# Patient Record
Sex: MALE | Race: WHITE | NOT HISPANIC OR LATINO
[De-identification: names, ages, dates, MRNs, and addresses within clinical notes are randomized per-mention and may not be internally consistent; named-entity substitution may affect disease eponyms.]

---

## 2017-03-20 PROBLEM — Z00.00 ENCOUNTER FOR PREVENTIVE HEALTH EXAMINATION: Status: ACTIVE | Noted: 2017-03-20

## 2017-03-27 ENCOUNTER — APPOINTMENT (OUTPATIENT)
Dept: SURGERY | Facility: CLINIC | Age: 57
End: 2017-03-27

## 2019-02-15 ENCOUNTER — EMERGENCY (EMERGENCY)
Facility: HOSPITAL | Age: 59
LOS: 0 days | Discharge: HOME | End: 2019-02-15
Attending: STUDENT IN AN ORGANIZED HEALTH CARE EDUCATION/TRAINING PROGRAM | Admitting: STUDENT IN AN ORGANIZED HEALTH CARE EDUCATION/TRAINING PROGRAM

## 2019-02-15 VITALS
HEART RATE: 89 BPM | OXYGEN SATURATION: 97 % | RESPIRATION RATE: 18 BRPM | SYSTOLIC BLOOD PRESSURE: 129 MMHG | TEMPERATURE: 98 F | DIASTOLIC BLOOD PRESSURE: 99 MMHG

## 2019-02-15 DIAGNOSIS — R10.32 LEFT LOWER QUADRANT PAIN: ICD-10-CM

## 2019-02-15 DIAGNOSIS — Z90.49 ACQUIRED ABSENCE OF OTHER SPECIFIED PARTS OF DIGESTIVE TRACT: ICD-10-CM

## 2019-02-15 LAB
ALBUMIN SERPL ELPH-MCNC: 4.3 G/DL — SIGNIFICANT CHANGE UP (ref 3.5–5.2)
ALP SERPL-CCNC: 111 U/L — SIGNIFICANT CHANGE UP (ref 30–115)
ALT FLD-CCNC: 27 U/L — SIGNIFICANT CHANGE UP (ref 0–41)
ANION GAP SERPL CALC-SCNC: 15 MMOL/L — HIGH (ref 7–14)
APPEARANCE UR: CLEAR — SIGNIFICANT CHANGE UP
AST SERPL-CCNC: 28 U/L — SIGNIFICANT CHANGE UP (ref 0–41)
BASOPHILS # BLD AUTO: 0.06 K/UL — SIGNIFICANT CHANGE UP (ref 0–0.2)
BASOPHILS NFR BLD AUTO: 0.9 % — SIGNIFICANT CHANGE UP (ref 0–1)
BILIRUB SERPL-MCNC: 0.6 MG/DL — SIGNIFICANT CHANGE UP (ref 0.2–1.2)
BILIRUB UR-MCNC: NEGATIVE — SIGNIFICANT CHANGE UP
BUN SERPL-MCNC: 23 MG/DL — HIGH (ref 10–20)
CALCIUM SERPL-MCNC: 9.4 MG/DL — SIGNIFICANT CHANGE UP (ref 8.5–10.1)
CHLORIDE SERPL-SCNC: 102 MMOL/L — SIGNIFICANT CHANGE UP (ref 98–110)
CO2 SERPL-SCNC: 21 MMOL/L — SIGNIFICANT CHANGE UP (ref 17–32)
COLOR SPEC: YELLOW — SIGNIFICANT CHANGE UP
CREAT SERPL-MCNC: 1.1 MG/DL — SIGNIFICANT CHANGE UP (ref 0.7–1.5)
DIFF PNL FLD: NEGATIVE — SIGNIFICANT CHANGE UP
EOSINOPHIL # BLD AUTO: 0.12 K/UL — SIGNIFICANT CHANGE UP (ref 0–0.7)
EOSINOPHIL NFR BLD AUTO: 1.8 % — SIGNIFICANT CHANGE UP (ref 0–8)
GLUCOSE SERPL-MCNC: 120 MG/DL — HIGH (ref 70–99)
GLUCOSE UR QL: NEGATIVE — SIGNIFICANT CHANGE UP
HCT VFR BLD CALC: 46.1 % — SIGNIFICANT CHANGE UP (ref 42–52)
HGB BLD-MCNC: 16 G/DL — SIGNIFICANT CHANGE UP (ref 14–18)
IMM GRANULOCYTES NFR BLD AUTO: 0.3 % — SIGNIFICANT CHANGE UP (ref 0.1–0.3)
KETONES UR-MCNC: NEGATIVE — SIGNIFICANT CHANGE UP
LEUKOCYTE ESTERASE UR-ACNC: NEGATIVE — SIGNIFICANT CHANGE UP
LIDOCAIN IGE QN: 25 U/L — SIGNIFICANT CHANGE UP (ref 7–60)
LYMPHOCYTES # BLD AUTO: 1.43 K/UL — SIGNIFICANT CHANGE UP (ref 1.2–3.4)
LYMPHOCYTES # BLD AUTO: 21.9 % — SIGNIFICANT CHANGE UP (ref 20.5–51.1)
MCHC RBC-ENTMCNC: 30.8 PG — SIGNIFICANT CHANGE UP (ref 27–31)
MCHC RBC-ENTMCNC: 34.7 G/DL — SIGNIFICANT CHANGE UP (ref 32–37)
MCV RBC AUTO: 88.7 FL — SIGNIFICANT CHANGE UP (ref 80–94)
MONOCYTES # BLD AUTO: 0.51 K/UL — SIGNIFICANT CHANGE UP (ref 0.1–0.6)
MONOCYTES NFR BLD AUTO: 7.8 % — SIGNIFICANT CHANGE UP (ref 1.7–9.3)
NEUTROPHILS # BLD AUTO: 4.4 K/UL — SIGNIFICANT CHANGE UP (ref 1.4–6.5)
NEUTROPHILS NFR BLD AUTO: 67.3 % — SIGNIFICANT CHANGE UP (ref 42.2–75.2)
NITRITE UR-MCNC: NEGATIVE — SIGNIFICANT CHANGE UP
NRBC # BLD: 0 /100 WBCS — SIGNIFICANT CHANGE UP (ref 0–0)
PH UR: 6 — SIGNIFICANT CHANGE UP (ref 5–8)
PLATELET # BLD AUTO: 211 K/UL — SIGNIFICANT CHANGE UP (ref 130–400)
POTASSIUM SERPL-MCNC: 4.9 MMOL/L — SIGNIFICANT CHANGE UP (ref 3.5–5)
POTASSIUM SERPL-SCNC: 4.9 MMOL/L — SIGNIFICANT CHANGE UP (ref 3.5–5)
PROT SERPL-MCNC: 7.3 G/DL — SIGNIFICANT CHANGE UP (ref 6–8)
PROT UR-MCNC: NEGATIVE — SIGNIFICANT CHANGE UP
RBC # BLD: 5.2 M/UL — SIGNIFICANT CHANGE UP (ref 4.7–6.1)
RBC # FLD: 12.1 % — SIGNIFICANT CHANGE UP (ref 11.5–14.5)
SODIUM SERPL-SCNC: 138 MMOL/L — SIGNIFICANT CHANGE UP (ref 135–146)
SP GR SPEC: >=1.03 — SIGNIFICANT CHANGE UP (ref 1.01–1.03)
UROBILINOGEN FLD QL: 0.2 — SIGNIFICANT CHANGE UP (ref 0.2–0.2)
WBC # BLD: 6.54 K/UL — SIGNIFICANT CHANGE UP (ref 4.8–10.8)
WBC # FLD AUTO: 6.54 K/UL — SIGNIFICANT CHANGE UP (ref 4.8–10.8)

## 2019-02-15 RX ORDER — FAMOTIDINE 10 MG/ML
20 INJECTION INTRAVENOUS ONCE
Qty: 0 | Refills: 0 | Status: COMPLETED | OUTPATIENT
Start: 2019-02-15 | End: 2019-02-15

## 2019-02-15 RX ORDER — IOHEXOL 300 MG/ML
30 INJECTION, SOLUTION INTRAVENOUS ONCE
Qty: 0 | Refills: 0 | Status: COMPLETED | OUTPATIENT
Start: 2019-02-15 | End: 2019-02-15

## 2019-02-15 RX ORDER — ACETAMINOPHEN 500 MG
650 TABLET ORAL ONCE
Qty: 0 | Refills: 0 | Status: COMPLETED | OUTPATIENT
Start: 2019-02-15 | End: 2019-02-15

## 2019-02-15 RX ADMIN — Medication 650 MILLIGRAM(S): at 08:59

## 2019-02-15 RX ADMIN — FAMOTIDINE 20 MILLIGRAM(S): 10 INJECTION INTRAVENOUS at 09:00

## 2019-02-15 RX ADMIN — IOHEXOL 30 MILLILITER(S): 300 INJECTION, SOLUTION INTRAVENOUS at 08:59

## 2019-02-15 NOTE — ED PROVIDER NOTE - CLINICAL SUMMARY MEDICAL DECISION MAKING FREE TEXT BOX
pt here for 1 month LLQ ap, labs, ctap negative. pt feeling better w/ supportive care. tolerating PO. well appearing. pt has surg/gi to f/u with.

## 2019-02-15 NOTE — ED PROVIDER NOTE - PLAN OF CARE
pt here for LLQ pain x1 month w/o associated sx. will get belly labs, ctap iv and oral con, ua. r/o pancreatitis, r/o abscess/diverticulitis

## 2019-02-15 NOTE — ED PROVIDER NOTE - CARE PLAN
Principal Discharge DX:	Abdominal pain, unspecified abdominal location  Assessment and plan of treatment:	pt here for LLQ pain x1 month w/o associated sx. will get belly labs, ctap iv and oral con, ua. r/o pancreatitis, r/o abscess/diverticulitis

## 2019-02-15 NOTE — ED PROVIDER NOTE - PHYSICAL EXAMINATION
PHYSICAL EXAM:    Constitutional: awake, alert, NAD  Eyes: EOMI, no conj injection  HENT: NC AT  Back: no c/t/l spine ttp  Respiratory: no respiratory distress, breath sounds equal b/l, no wheezing, rhonchi or stridor.   Cardiovascular: RRR nml S1S2  Gastrointestinal: soft, no masses, mild LLQ tenderness on palpation. nondistended. No guarding or rebound. no cvat  Extremities: no peripheral edema  Neurological: AAOx3, CN II-XII grossly intact, no focal numbness or weakness  Skin: no rash  Musculoskeletal: no gross deformity

## 2019-02-15 NOTE — ED PROVIDER NOTE - NSFOLLOWUPINSTRUCTIONS_ED_ALL_ED_FT
Take ibuprofen 400mg every 4 hours as needed for pain. Follow up with your specialists within 1 month. Return for worsening symptoms.

## 2019-02-15 NOTE — ED PROVIDER NOTE - NS ED ROS FT
Constitutional: no fever or rigors  Eyes: no eye redness, acute visual change  ENMT: no ear pain, no throat pain  Card: no chest pain, no palpitations  Pulm: no cough, no shortness of breath  GI: pos abdominal pain, no nausea or vomiting  : no dysuria or hematuria  MSK: no limitation in range of motion, no neck pain  Skin: no rash, no abrasion  Neuro: no numbness, no weakness  Heme/Onc: no easy bruising, no bleeding tendency   Allergic: no hives, no throat swelling

## 2019-02-15 NOTE — ED PROVIDER NOTE - OBJECTIVE STATEMENT
57 yo m hx arthritis, s/p partial colectomy for diverticulitis years ago by [facility] here for LLQ AP. pain for 1 month, gradually worsening. pain not as bad as prev diverticulitis. no dysuria or hematuria. no fever, chills, cp, sob.

## 2019-02-16 LAB
CULTURE RESULTS: SIGNIFICANT CHANGE UP
SPECIMEN SOURCE: SIGNIFICANT CHANGE UP

## 2021-03-31 ENCOUNTER — TRANSCRIPTION ENCOUNTER (OUTPATIENT)
Age: 61
End: 2021-03-31

## 2021-08-16 ENCOUNTER — TRANSCRIPTION ENCOUNTER (OUTPATIENT)
Age: 61
End: 2021-08-16

## 2021-09-06 ENCOUNTER — TRANSCRIPTION ENCOUNTER (OUTPATIENT)
Age: 61
End: 2021-09-06

## 2022-01-26 ENCOUNTER — INPATIENT (INPATIENT)
Facility: HOSPITAL | Age: 62
LOS: 1 days | Discharge: HOME | End: 2022-01-28
Attending: INTERNAL MEDICINE | Admitting: INTERNAL MEDICINE
Payer: COMMERCIAL

## 2022-01-26 VITALS
OXYGEN SATURATION: 97 % | RESPIRATION RATE: 17 BRPM | SYSTOLIC BLOOD PRESSURE: 125 MMHG | HEART RATE: 100 BPM | TEMPERATURE: 98 F | DIASTOLIC BLOOD PRESSURE: 82 MMHG | WEIGHT: 164.91 LBS

## 2022-01-26 DIAGNOSIS — Z90.49 ACQUIRED ABSENCE OF OTHER SPECIFIED PARTS OF DIGESTIVE TRACT: Chronic | ICD-10-CM

## 2022-01-26 PROBLEM — M19.90 UNSPECIFIED OSTEOARTHRITIS, UNSPECIFIED SITE: Chronic | Status: ACTIVE | Noted: 2019-02-15

## 2022-01-26 LAB
A1C WITH ESTIMATED AVERAGE GLUCOSE RESULT: 7.2 % — HIGH (ref 4–5.6)
ALBUMIN SERPL ELPH-MCNC: 4.3 G/DL — SIGNIFICANT CHANGE UP (ref 3.5–5.2)
ALP SERPL-CCNC: 126 U/L — HIGH (ref 30–115)
ALT FLD-CCNC: 19 U/L — SIGNIFICANT CHANGE UP (ref 0–41)
ANION GAP SERPL CALC-SCNC: 10 MMOL/L — SIGNIFICANT CHANGE UP (ref 7–14)
APTT BLD: 35.9 SEC — SIGNIFICANT CHANGE UP (ref 27–39.2)
AST SERPL-CCNC: 25 U/L — SIGNIFICANT CHANGE UP (ref 0–41)
BASOPHILS # BLD AUTO: 0.07 K/UL — SIGNIFICANT CHANGE UP (ref 0–0.2)
BASOPHILS NFR BLD AUTO: 0.9 % — SIGNIFICANT CHANGE UP (ref 0–1)
BILIRUB SERPL-MCNC: 0.5 MG/DL — SIGNIFICANT CHANGE UP (ref 0.2–1.2)
BLD GP AB SCN SERPL QL: SIGNIFICANT CHANGE UP
BUN SERPL-MCNC: 18 MG/DL — SIGNIFICANT CHANGE UP (ref 10–20)
CALCIUM SERPL-MCNC: 9.2 MG/DL — SIGNIFICANT CHANGE UP (ref 8.5–10.1)
CHLORIDE SERPL-SCNC: 102 MMOL/L — SIGNIFICANT CHANGE UP (ref 98–110)
CHOLEST SERPL-MCNC: 194 MG/DL — SIGNIFICANT CHANGE UP
CO2 SERPL-SCNC: 24 MMOL/L — SIGNIFICANT CHANGE UP (ref 17–32)
CREAT SERPL-MCNC: 1 MG/DL — SIGNIFICANT CHANGE UP (ref 0.7–1.5)
EOSINOPHIL # BLD AUTO: 0.09 K/UL — SIGNIFICANT CHANGE UP (ref 0–0.7)
EOSINOPHIL NFR BLD AUTO: 1.1 % — SIGNIFICANT CHANGE UP (ref 0–8)
ESTIMATED AVERAGE GLUCOSE: 160 MG/DL — HIGH (ref 68–114)
GLUCOSE SERPL-MCNC: 226 MG/DL — HIGH (ref 70–99)
HCT VFR BLD CALC: 40.2 % — LOW (ref 42–52)
HCT VFR BLD CALC: 43.7 % — SIGNIFICANT CHANGE UP (ref 42–52)
HDLC SERPL-MCNC: 36 MG/DL — LOW
HGB BLD-MCNC: 14.1 G/DL — SIGNIFICANT CHANGE UP (ref 14–18)
HGB BLD-MCNC: 15.3 G/DL — SIGNIFICANT CHANGE UP (ref 14–18)
IMM GRANULOCYTES NFR BLD AUTO: 0.4 % — HIGH (ref 0.1–0.3)
INR BLD: 0.99 RATIO — SIGNIFICANT CHANGE UP (ref 0.65–1.3)
LIPID PNL WITH DIRECT LDL SERPL: 131 MG/DL — HIGH
LYMPHOCYTES # BLD AUTO: 1.11 K/UL — LOW (ref 1.2–3.4)
LYMPHOCYTES # BLD AUTO: 13.7 % — LOW (ref 20.5–51.1)
MAGNESIUM SERPL-MCNC: 1.8 MG/DL — SIGNIFICANT CHANGE UP (ref 1.8–2.4)
MCHC RBC-ENTMCNC: 30.6 PG — SIGNIFICANT CHANGE UP (ref 27–31)
MCHC RBC-ENTMCNC: 31.4 PG — HIGH (ref 27–31)
MCHC RBC-ENTMCNC: 35 G/DL — SIGNIFICANT CHANGE UP (ref 32–37)
MCHC RBC-ENTMCNC: 35.1 G/DL — SIGNIFICANT CHANGE UP (ref 32–37)
MCV RBC AUTO: 87.2 FL — SIGNIFICANT CHANGE UP (ref 80–94)
MCV RBC AUTO: 89.5 FL — SIGNIFICANT CHANGE UP (ref 80–94)
MONOCYTES # BLD AUTO: 0.59 K/UL — SIGNIFICANT CHANGE UP (ref 0.1–0.6)
MONOCYTES NFR BLD AUTO: 7.3 % — SIGNIFICANT CHANGE UP (ref 1.7–9.3)
NEUTROPHILS # BLD AUTO: 6.2 K/UL — SIGNIFICANT CHANGE UP (ref 1.4–6.5)
NEUTROPHILS NFR BLD AUTO: 76.6 % — HIGH (ref 42.2–75.2)
NON HDL CHOLESTEROL: 158 MG/DL — HIGH
NRBC # BLD: 0 /100 WBCS — SIGNIFICANT CHANGE UP (ref 0–0)
NRBC # BLD: 0 /100 WBCS — SIGNIFICANT CHANGE UP (ref 0–0)
NT-PROBNP SERPL-SCNC: 263 PG/ML — SIGNIFICANT CHANGE UP (ref 0–300)
PLATELET # BLD AUTO: 206 K/UL — SIGNIFICANT CHANGE UP (ref 130–400)
PLATELET # BLD AUTO: 208 K/UL — SIGNIFICANT CHANGE UP (ref 130–400)
POTASSIUM SERPL-MCNC: 4.7 MMOL/L — SIGNIFICANT CHANGE UP (ref 3.5–5)
POTASSIUM SERPL-SCNC: 4.7 MMOL/L — SIGNIFICANT CHANGE UP (ref 3.5–5)
PROT SERPL-MCNC: 7.3 G/DL — SIGNIFICANT CHANGE UP (ref 6–8)
PROTHROM AB SERPL-ACNC: 11.4 SEC — SIGNIFICANT CHANGE UP (ref 9.95–12.87)
RBC # BLD: 4.61 M/UL — LOW (ref 4.7–6.1)
RBC # BLD: 4.88 M/UL — SIGNIFICANT CHANGE UP (ref 4.7–6.1)
RBC # FLD: 12.3 % — SIGNIFICANT CHANGE UP (ref 11.5–14.5)
RBC # FLD: 12.4 % — SIGNIFICANT CHANGE UP (ref 11.5–14.5)
SARS-COV-2 RNA SPEC QL NAA+PROBE: SIGNIFICANT CHANGE UP
SODIUM SERPL-SCNC: 136 MMOL/L — SIGNIFICANT CHANGE UP (ref 135–146)
TRIGL SERPL-MCNC: 216 MG/DL — HIGH
TROPONIN T SERPL-MCNC: 0.08 NG/ML — CRITICAL HIGH
TROPONIN T SERPL-MCNC: 0.14 NG/ML — CRITICAL HIGH
TSH SERPL-MCNC: 1.01 UIU/ML — SIGNIFICANT CHANGE UP (ref 0.27–4.2)
WBC # BLD: 6.86 K/UL — SIGNIFICANT CHANGE UP (ref 4.8–10.8)
WBC # BLD: 8.09 K/UL — SIGNIFICANT CHANGE UP (ref 4.8–10.8)
WBC # FLD AUTO: 6.86 K/UL — SIGNIFICANT CHANGE UP (ref 4.8–10.8)
WBC # FLD AUTO: 8.09 K/UL — SIGNIFICANT CHANGE UP (ref 4.8–10.8)

## 2022-01-26 PROCEDURE — 93458 L HRT ARTERY/VENTRICLE ANGIO: CPT | Mod: 26,XU

## 2022-01-26 PROCEDURE — 71045 X-RAY EXAM CHEST 1 VIEW: CPT | Mod: 26

## 2022-01-26 PROCEDURE — 93010 ELECTROCARDIOGRAM REPORT: CPT | Mod: 76

## 2022-01-26 PROCEDURE — 92978 ENDOLUMINL IVUS OCT C 1ST: CPT | Mod: 26,RC

## 2022-01-26 PROCEDURE — 92928 PRQ TCAT PLMT NTRAC ST 1 LES: CPT | Mod: RC

## 2022-01-26 PROCEDURE — 99285 EMERGENCY DEPT VISIT HI MDM: CPT

## 2022-01-26 RX ORDER — EPTIFIBATIDE 2 MG/ML
2 INJECTION, SOLUTION INTRAVENOUS
Qty: 75 | Refills: 0 | Status: DISCONTINUED | OUTPATIENT
Start: 2022-01-26 | End: 2022-01-26

## 2022-01-26 RX ORDER — SODIUM CHLORIDE 9 MG/ML
1000 INJECTION INTRAMUSCULAR; INTRAVENOUS; SUBCUTANEOUS
Refills: 0 | Status: COMPLETED | OUTPATIENT
Start: 2022-01-26 | End: 2022-01-26

## 2022-01-26 RX ORDER — NITROGLYCERIN 6.5 MG
10 CAPSULE, EXTENDED RELEASE ORAL
Qty: 50 | Refills: 0 | Status: DISCONTINUED | OUTPATIENT
Start: 2022-01-26 | End: 2022-01-27

## 2022-01-26 RX ORDER — ATORVASTATIN CALCIUM 80 MG/1
80 TABLET, FILM COATED ORAL AT BEDTIME
Refills: 0 | Status: DISCONTINUED | OUTPATIENT
Start: 2022-01-26 | End: 2022-01-28

## 2022-01-26 RX ORDER — ASPIRIN/CALCIUM CARB/MAGNESIUM 324 MG
81 TABLET ORAL DAILY
Refills: 0 | Status: DISCONTINUED | OUTPATIENT
Start: 2022-01-27 | End: 2022-01-28

## 2022-01-26 RX ORDER — MAGNESIUM SULFATE 500 MG/ML
2 VIAL (ML) INJECTION ONCE
Refills: 0 | Status: DISCONTINUED | OUTPATIENT
Start: 2022-01-26 | End: 2022-01-26

## 2022-01-26 RX ORDER — TICAGRELOR 90 MG/1
90 TABLET ORAL EVERY 12 HOURS
Refills: 0 | Status: DISCONTINUED | OUTPATIENT
Start: 2022-01-26 | End: 2022-01-28

## 2022-01-26 RX ORDER — METOPROLOL TARTRATE 50 MG
12.5 TABLET ORAL
Refills: 0 | Status: DISCONTINUED | OUTPATIENT
Start: 2022-01-26 | End: 2022-01-27

## 2022-01-26 RX ORDER — EPTIFIBATIDE 2 MG/ML
2 INJECTION, SOLUTION INTRAVENOUS
Qty: 75 | Refills: 0 | Status: DISCONTINUED | OUTPATIENT
Start: 2022-01-26 | End: 2022-01-27

## 2022-01-26 RX ORDER — ASPIRIN/CALCIUM CARB/MAGNESIUM 324 MG
324 TABLET ORAL ONCE
Refills: 0 | Status: COMPLETED | OUTPATIENT
Start: 2022-01-26 | End: 2022-01-26

## 2022-01-26 RX ORDER — CHLORHEXIDINE GLUCONATE 213 G/1000ML
1 SOLUTION TOPICAL
Refills: 0 | Status: DISCONTINUED | OUTPATIENT
Start: 2022-01-26 | End: 2022-01-28

## 2022-01-26 RX ORDER — PANTOPRAZOLE SODIUM 20 MG/1
40 TABLET, DELAYED RELEASE ORAL
Refills: 0 | Status: DISCONTINUED | OUTPATIENT
Start: 2022-01-26 | End: 2022-01-28

## 2022-01-26 RX ORDER — ASPIRIN/CALCIUM CARB/MAGNESIUM 324 MG
162 TABLET ORAL ONCE
Refills: 0 | Status: DISCONTINUED | OUTPATIENT
Start: 2022-01-26 | End: 2022-01-26

## 2022-01-26 RX ADMIN — Medication 3 MICROGRAM(S)/MIN: at 17:47

## 2022-01-26 RX ADMIN — CHLORHEXIDINE GLUCONATE 1 APPLICATION(S): 213 SOLUTION TOPICAL at 22:26

## 2022-01-26 RX ADMIN — Medication 324 MILLIGRAM(S): at 08:29

## 2022-01-26 RX ADMIN — TICAGRELOR 90 MILLIGRAM(S): 90 TABLET ORAL at 23:45

## 2022-01-26 RX ADMIN — PANTOPRAZOLE SODIUM 40 MILLIGRAM(S): 20 TABLET, DELAYED RELEASE ORAL at 22:26

## 2022-01-26 RX ADMIN — EPTIFIBATIDE 12 MICROGRAM(S)/KG/MIN: 2 INJECTION, SOLUTION INTRAVENOUS at 21:45

## 2022-01-26 RX ADMIN — Medication 12.5 MILLIGRAM(S): at 21:47

## 2022-01-26 RX ADMIN — ATORVASTATIN CALCIUM 80 MILLIGRAM(S): 80 TABLET, FILM COATED ORAL at 21:47

## 2022-01-26 RX ADMIN — SODIUM CHLORIDE 100 MILLILITER(S): 9 INJECTION INTRAMUSCULAR; INTRAVENOUS; SUBCUTANEOUS at 17:46

## 2022-01-26 RX ADMIN — EPTIFIBATIDE 12 MICROGRAM(S)/KG/MIN: 2 INJECTION, SOLUTION INTRAVENOUS at 17:47

## 2022-01-26 NOTE — ED PROVIDER NOTE - ATTENDING CONTRIBUTION TO CARE
61-year-old male family history of CAD, ex-smoker, presenting for substernal chest pain x3 days.  Per patient he has been having intermittent episodes of substernal chest pain worse with exertion and improved with rest.  This morning episode did not resolve as quickly which prompted patient to come to ED.  Pain nonradiating, associated shortness of breath and nausea.  Per patient chest pain currently resolved.  No vomiting.  No lower extremity pain or swelling.  Well appearing, NAD, non toxic. NCAT PERRLA EOMI neck supple non tender normal wob cta bl rrr abdomen s nt nd no rebound no guarding WWPx4 neuro non focal

## 2022-01-26 NOTE — CHART NOTE - NSCHARTNOTEFT_GEN_A_CORE
PRE-OP DIAGNOSIS: NSTEMI    PROCEDURE:   [x] Coronary Angiogram     [x] LHC      [x] Intervention (see below)      PHYSICIAN: Dr. Prado    FELLOW: Dr. Jara, Dr. Ortiz    PROCEDURE DESCRIPTION:     Consent:      [X] Patient      Anesthesia:     [X] Sedation     [x] Local     Access & Closure:     [x] 6Fr Radial Artery sheath left in place for hemodynamic BP monitoring    Intervention: PCI ANALIA x 2 Resolute to mRCA and dRCA stenosis    AUC score: 8    FINDINGS:   Coronary Dominance: right    LM: no disease     LAD: proximal no disease; mid discrete 95% stenosis at D1 origin; distal no disease  Diag: mild ostial disease    CX: luminal irregularities   OM: no disease    Ramus: large sized, 70% tubular stenosis in mid-segment    RCA: proximal mild disease; mid 90% thrombotic tubular stenosis; distal 95% discrete stenosis   RPDA: no disease    LVEDP: normal    Syntax score:    ESTIMATED BLOOD LOSS: < 10 mL        CONDITION:     [X] Good     POST-OP DIAGNOSIS:      [x] significant triple vessel disease     PLAN OF CARE:     [x] Admit to CCU    [x] Aggressive medical management: continue with nitro gtt; start integrilin gtt for 18h; c/w DAPT, lipitor and BB.    [x] IV Fluids: NS at 100cc/hr x 10h    [x] Will discuss staged PCI    Julian Ortiz PGY4 PRE-OP DIAGNOSIS: NSTEMI    PROCEDURE:   [x] Coronary Angiogram     [x] LHC      [x] Intervention (see below)      PHYSICIAN: Dr. Prado    FELLOW: Dr. Jara, Dr. Ortiz    PROCEDURE DESCRIPTION:     Consent:      [X] Patient      Anesthesia:     [X] Sedation     [x] Local     Access & Closure:     [x] 6Fr Radial Artery sheath left in place for hemodynamic BP monitoring    Intervention: PCI ANALIA x 2 Resolute to mRCA and dRCA stenosis    AUC score: 8    FINDINGS:   Coronary Dominance: right    LM: no disease     LAD: proximal no disease; mid discrete 95% stenosis at D1 origin, s/p unsuccessful crossing of this lesion; distal no disease  Diag: mild ostial disease    CX: luminal irregularities   OM: no disease    Ramus: large sized, 70% tubular stenosis in mid-segment    RCA: proximal mild disease; mid 90% thrombotic tubular stenosis; distal 95% discrete stenosis; s/p successful PCI  RPDA: no disease    LVEDP: normal    Syntax score:    ESTIMATED BLOOD LOSS: < 10 mL        CONDITION:     [X] Good     POST-OP DIAGNOSIS:      [x] significant triple vessel disease     PLAN OF CARE:     [x] Admit to CCU    [x] Aggressive medical management: continue with nitro gtt; start integrilin gtt for 18h; c/w DAPT, lipitor and BB.    [x] IV Fluids: NS at 100cc/hr x 10h    [x] Will discuss staged PCI via femoral access    Julian Ortiz PGY4 PRE-OP DIAGNOSIS: NSTEMI    PROCEDURE:   [x] Coronary Angiogram     [x] LHC      [x] Intervention (see below)      PHYSICIAN: Dr. Prado    FELLOW: Dr. Jara, Dr. Ortiz    PROCEDURE DESCRIPTION:     Consent:      [X] Patient      Anesthesia:     [X] Sedation     [x] Local     Access & Closure:     [x] 6Fr Radial Artery sheath left in place for hemodynamic BP monitoring    Intervention: PCI ANALIA x 2 Resolute to mRCA and dRCA stenosis    AUC score: 8    FINDINGS:   Coronary Dominance: right    LM: no disease     LAD: proximal mild disease; mid discrete 95% stenosis at D1 origin, s/p unsuccessful crossing of this lesion; distal no disease  Diag: mild ostial disease    CX: luminal irregularities   OM: no disease    Ramus: large sized, 70% tubular stenosis in mid-segment    RCA: proximal mild disease; mid 90% thrombotic tubular stenosis; distal 95% discrete stenosis; s/p successful PCI  RPDA: no disease    LVEDP: normal    Syntax score:    ESTIMATED BLOOD LOSS: < 10 mL        CONDITION:     [X] Good     POST-OP DIAGNOSIS:      [x] significant triple vessel disease, syntax 16     PLAN OF CARE:     [x] Admit to CCU    [x] Aggressive medical management: continue with nitro gtt; start Integrilin gtt for 18h; c/w DAPT, Lipitor and BB.    [x] IV Fluids: NS at 100cc/hr x 10h    [x] Will discuss staged PCI via femoral access    Julian Ortiz PGY4

## 2022-01-26 NOTE — H&P ADULT - HISTORY OF PRESENT ILLNESS
Patient is a 60yo male with PMH of arthritis, hx of diverticulitis s/p partial colon resection, presents for chest pain of 2 days. Chest pain started yesterday while working . Central, pressure-like pain, non-radiating, alleviated yesterday with rest. Reports chronic numbness in both arms due to arhtritis. This am, patient had chest pain again on his way to the bus stop (4 blocks from home). Worse then yesterday. He went to work, pain did not resolve, was brought back home by his boss, alleviated after 2 hrs. Was brought in to the hospital by his wife. Chest pain resolved by the time he presented to the ED. Currently, he denies chest pain, palpitations, dizziness, fevers, chills, N/V/D, abdominal pain, Le edema. He does not follow a cardiologist. Denies hx of HTN or HLD. Reports he had a cardiac cath done 30 years ago, at that time, he was using cocaine and had presented to the hospital for chest pain as well. Patient reports that the cath report was normal. Currently he denies tobacco use, cocaine use. Uses marijuana daily. Hx of tobacco use, stopped 10 years ago, 2p/day for 35 years. Social alcohol drinker. Family history of MI in father at age 40,  at 50s from MI. Mother with PPM, AICD,  at age 91 from cancer. Of note patient reports having COVID one month ago.     In the ED: Vitals: Afebrile, /82, , RR 17, 97% RA, CBC wnl, CMP with mild alk phos elevation 126, Mg 1.8, repleted, Trop elevated 0.08. EKG with new T wave inversions Lead III, AVF, V6, new compared to EKG in 2016. . CHest Xray no cardiomegaly, clear.  Patient is a 60yo male with PMH of arthritis, hx of diverticulitis s/p partial colon resection, presents for chest pain of 2 days. Chest pain started yesterday while working . Central, pressure-like pain, non-radiating, alleviated yesterday with rest. Reports chronic numbness in both arms due to arhtritis. This am, patient had chest pain again on his way to the bus stop (4 blocks from home). Worse then yesterday. He went to work, pain did not resolve, was brought back home by his boss, alleviated after 2 hrs. Was brought in to the hospital by his wife. Chest pain resolved by the time he presented to the ED. Currently, he denies chest pain, palpitations, dizziness, fevers, chills, N/V/D, abdominal pain, Le edema. He does not follow a cardiologist. Denies hx of HTN, HLD or previous MI. Does not take any medications currently. Reports he had a cardiac cath done 30 years ago, at that time, he was using cocaine and had presented to the hospital for chest pain as well. Patient reports that the cath report was normal. Currently he denies tobacco use, cocaine use. Uses marijuana daily. Hx of tobacco use, stopped 10 years ago, 2p/day for 35 years. Social alcohol drinker. Family history of MI in father at age 40,  at 50s from MI. Mother with PPM, AICD,  at age 91 from cancer. Of note patient reports having COVID one month ago.     In the ED: Vitals: Afebrile, /82, , RR 17, 97% RA, CBC wnl, CMP with mild alk phos elevation 126, Mg 1.8, repleted, Trop elevated 0.08. EKG with new T wave inversions Lead III, AVF, V6, new compared to EKG in 2016. . CHest Xray no cardiomegaly, clear.

## 2022-01-26 NOTE — ED PROVIDER NOTE - NS ED ROS FT
Constitutional: (-) fever  Eyes/ENT: (-) blurry vision, (-) epistaxis  Cardiovascular: (+) chest pain, (-) syncope  Respiratory: (+) sob, (-) cough  Gastrointestinal: (-) vomiting, (-) diarrhea  : (-) dysuria, (-) hematuria  Musculoskeletal: (-) neck pain, (-) back pain, (-) joint pain  Integumentary: (-) rash, (-) edema  Neurological: (-) headache, (-) altered mental status  Allergic/Immunologic: (-) pruritus

## 2022-01-26 NOTE — H&P ADULT - NSHPREVIEWOFSYSTEMS_GEN_ALL_CORE
CONSTITUTIONAL - No fever, No diaphoresis, No weight change  SKIN - No rash  HEMATOLOGIC - No abnormal bleeding or bruising  EYES - No eye pain, No blurred vision  ENT - No change in hearing, No sore throat, No neck pain, No rhinorrhea, No ear pain  RESPIRATORY - No shortness of breath, No cough  CARDIAC -No chest pain, No palpitations  GI - No abdominal pain, No nausea, No vomiting, No diarrhea, No constipation, No bright red blood per rectum or melena. No flank pain  - No dysuria, frequency, hematuria.   ENDO - No polydypsia, No polyuria, No heat/cold intolerance  MUSCULOSKELETAL - No joint paint, No swelling, No back pain  NEUROLOGIC - No numbness, No focal weakness, No headache, No dizziness  All other systems negative, unless specified in HPI

## 2022-01-26 NOTE — ED ADULT TRIAGE NOTE - CHIEF COMPLAINT QUOTE
"im having chest pain and trouble breathing for 2 days" "im having chest pain and trouble breathing for 2 days , it feels like a turkey sandiwch is stuck here, I have little nausuea, I left work eARLY"

## 2022-01-26 NOTE — ED PROVIDER NOTE - CLINICAL SUMMARY MEDICAL DECISION MAKING FREE TEXT BOX
61-year-old male family history of CAD, ex-smoker, presenting for substernal chest pain x3 days.  Per patient he has been having intermittent episodes of substernal chest pain worse with exertion and improved with rest.  This morning episode did not resolve as quickly which prompted patient to come to ED.  Pain nonradiating, associated shortness of breath and nausea.  Per patient chest pain currently resolved.  No vomiting.  No lower extremity pain or swelling.  Well appearing, NAD, non toxic. NCAT PERRLA EOMI neck supple non tender normal wob cta bl rrr abdomen s nt nd no rebound no guarding WWPx4 neuro non focal. labs ekg imaging reviewed. asa given. will admit

## 2022-01-26 NOTE — ED PROVIDER NOTE - OBJECTIVE STATEMENT
61y M pmh diverticulitis s/p colon resection, stopped smoking x10yrs ago, Father MI @49yo presents for eval of chest pain. Pt has intermittent moderate squeezing chest pain x2 day, aggravated with ambulation, no relieving factors. Associated sob. Denies fever, ha, cough, weakness, numbness, dysuria, hematuria, n/v/d/c

## 2022-01-26 NOTE — ED ADULT TRIAGE NOTE - RESPIRATORY RATE (BREATHS/MIN)
Per PAPDMP  Eduardo Yanes hasn't prescribed her anything in years  The last script is 2019  Where did she get her last dose filled? I can call the pharmacy to verify  17

## 2022-01-26 NOTE — H&P ADULT - ASSESSMENT
Patient is a 60yo male with PMH of arthritis, hx of diverticulitis s/p partial colon resection, presents for chest pain of 2 days.     #NSTEMI  - atypical chest pain x2 days  - EKG with new t-wave inversions  - trop x1 0.08  - s/p ASpirin   Plan:  - trend trops  - serial EKG  - c/w aspirin daily  - start metoprolol   - start atorvastatin   - Keep NPO for possible cath vs stress test     #Arthritis  - stable  - Not on any medications currently    #Hx of diverticulitis s/p partial colon resection  - Denies abdominal pain   - F/U GI outpatient       #Misc:  - GI prophylaxis:   - DVT prophylaxis  - Diet: NPO  - Dispo: Acute, admit to tele    Patient is a 60yo male with PMH of arthritis, hx of diverticulitis s/p partial colon resection, presents for chest pain of 2 days.     #NSTEMI  - atypical chest pain x2 days  - EKG with new t-wave inversions  - trop x1 0.08  - s/p ASpirin   Plan:  - trend trops  - serial EKG  - c/w aspirin daily  - f/u a1c, lipid profile, tsh  - start metoprolol   - start atorvastatin   - Keep NPO for possible cath vs stress test     #Arthritis  - stable  - Not on any medications currently    #Hx of diverticulitis s/p partial colon resection  - Denies abdominal pain   - F/U GI outpatient       #Misc:  - GI prophylaxis:   - DVT prophylaxis  - Diet: NPO  - Dispo: Acute, admit to tele    Patient is a 62yo male with PMH of arthritis, hx of diverticulitis s/p partial colon resection, presents for chest pain of 2 days.     #NSTEMI  - atypical chest pain x2 days, now resolved   - EKG with new t-wave inversions  - trop x1 0.08  - s/p Aspirin load  Plan:  - trend trops  - serial EKG  - c/w aspirin daily  - f/u a1c, lipid profile, tsh  - f/u TTE  - start metoprolol 12.5BID  - start atorvastatin 80mg daily  - Keep NPO for cath today     #Arthritis  - stable  - Not on any medications currently    #Hx of diverticulitis s/p partial colon resection  - Denies abdominal pain   - F/U GI outpatient     #Misc:  - GI prophylaxis:   - DVT prophylaxis  - Diet: NPO  - Dispo: Acute, admit to tele

## 2022-01-26 NOTE — H&P ADULT - ATTENDING COMMENTS
Mr. Lombardi is a 61yoM with diverticulitis s/p partial colon resection, COVID-19 infection in 12/2021, daily THC use, history of tobacco use (70 pack-years), and family history of early CAD who presented with 2 days of chest tightness with associated SOB. Patient first developed symptoms on 1/24 in the evening at rest. Had recurrent symptoms yesterday 1/25 that occurred with exertion and improved with rest. Today, patient had severe chest tightness and dyspnea while on the way to work, prompting his presentation.     ECG with sinus rhythm and TWI in III and aVL. Troponin 0.08. Patient was free of chest pain at the time of my evaluation. Given his typical anginal symptoms and positive troponin, patient was admitted for management of NSTEMI.     Plan:   - s/p ASA 32 mg   - ASA 81 mg daily and atorvastatin 80 mg night  - Start Lopressor 12.5 mg BID for CAD, will uptitrate after LHC   - A1c, lipid panel, and TSH pending  - NPO  - LHC today with premedication for contrast allergy  - TTE ordered

## 2022-01-26 NOTE — ED ADULT NURSE NOTE - CHIEF COMPLAINT QUOTE
"im having chest pain and trouble breathing for 2 days , it feels like a turkey sandiwch is stuck here, I have little nausuea, I left work eARLY"

## 2022-01-26 NOTE — H&P ADULT - NSHPSOCIALHISTORY_GEN_ALL_CORE
Currently he denies tobacco use, cocaine use. Uses marijuana daily. Hx of tobacco use, stopped 10 years ago, 2p/day for 35 years. Social alcohol drinker.

## 2022-01-26 NOTE — H&P ADULT - TIME BILLING
Care coordination for Cath Lab  Bed management  Bedside interview and evaluation  Explanation of [facility]

## 2022-01-27 LAB
ALBUMIN SERPL ELPH-MCNC: 3.4 G/DL — LOW (ref 3.5–5.2)
ALP SERPL-CCNC: 88 U/L — SIGNIFICANT CHANGE UP (ref 30–115)
ALT FLD-CCNC: 17 U/L — SIGNIFICANT CHANGE UP (ref 0–41)
ANION GAP SERPL CALC-SCNC: 14 MMOL/L — SIGNIFICANT CHANGE UP (ref 7–14)
AST SERPL-CCNC: 33 U/L — SIGNIFICANT CHANGE UP (ref 0–41)
BASOPHILS # BLD AUTO: 0.05 K/UL — SIGNIFICANT CHANGE UP (ref 0–0.2)
BASOPHILS # BLD AUTO: 0.06 K/UL — SIGNIFICANT CHANGE UP (ref 0–0.2)
BASOPHILS NFR BLD AUTO: 0.5 % — SIGNIFICANT CHANGE UP (ref 0–1)
BASOPHILS NFR BLD AUTO: 0.6 % — SIGNIFICANT CHANGE UP (ref 0–1)
BILIRUB SERPL-MCNC: 0.7 MG/DL — SIGNIFICANT CHANGE UP (ref 0.2–1.2)
BUN SERPL-MCNC: 18 MG/DL — SIGNIFICANT CHANGE UP (ref 10–20)
CALCIUM SERPL-MCNC: 8.7 MG/DL — SIGNIFICANT CHANGE UP (ref 8.5–10.1)
CHLORIDE SERPL-SCNC: 106 MMOL/L — SIGNIFICANT CHANGE UP (ref 98–110)
CO2 SERPL-SCNC: 18 MMOL/L — SIGNIFICANT CHANGE UP (ref 17–32)
CREAT SERPL-MCNC: 0.9 MG/DL — SIGNIFICANT CHANGE UP (ref 0.7–1.5)
EOSINOPHIL # BLD AUTO: 0.14 K/UL — SIGNIFICANT CHANGE UP (ref 0–0.7)
EOSINOPHIL # BLD AUTO: 0.14 K/UL — SIGNIFICANT CHANGE UP (ref 0–0.7)
EOSINOPHIL NFR BLD AUTO: 1.3 % — SIGNIFICANT CHANGE UP (ref 0–8)
EOSINOPHIL NFR BLD AUTO: 1.3 % — SIGNIFICANT CHANGE UP (ref 0–8)
GLUCOSE BLDC GLUCOMTR-MCNC: 127 MG/DL — HIGH (ref 70–99)
GLUCOSE BLDC GLUCOMTR-MCNC: 141 MG/DL — HIGH (ref 70–99)
GLUCOSE BLDC GLUCOMTR-MCNC: 144 MG/DL — HIGH (ref 70–99)
GLUCOSE BLDC GLUCOMTR-MCNC: 154 MG/DL — HIGH (ref 70–99)
GLUCOSE SERPL-MCNC: 132 MG/DL — HIGH (ref 70–99)
HCT VFR BLD CALC: 37.2 % — LOW (ref 42–52)
HCT VFR BLD CALC: 37.9 % — LOW (ref 42–52)
HCT VFR BLD CALC: 39.3 % — LOW (ref 42–52)
HCV AB S/CO SERPL IA: 0.03 COI — SIGNIFICANT CHANGE UP
HCV AB SERPL-IMP: SIGNIFICANT CHANGE UP
HGB BLD-MCNC: 13.3 G/DL — LOW (ref 14–18)
HGB BLD-MCNC: 13.4 G/DL — LOW (ref 14–18)
HGB BLD-MCNC: 13.6 G/DL — LOW (ref 14–18)
IMM GRANULOCYTES NFR BLD AUTO: 0.4 % — HIGH (ref 0.1–0.3)
IMM GRANULOCYTES NFR BLD AUTO: 0.4 % — HIGH (ref 0.1–0.3)
LYMPHOCYTES # BLD AUTO: 1.81 K/UL — SIGNIFICANT CHANGE UP (ref 1.2–3.4)
LYMPHOCYTES # BLD AUTO: 1.85 K/UL — SIGNIFICANT CHANGE UP (ref 1.2–3.4)
LYMPHOCYTES # BLD AUTO: 17.4 % — LOW (ref 20.5–51.1)
LYMPHOCYTES # BLD AUTO: 17.6 % — LOW (ref 20.5–51.1)
MAGNESIUM SERPL-MCNC: 1.6 MG/DL — LOW (ref 1.8–2.4)
MAGNESIUM SERPL-MCNC: 1.9 MG/DL — SIGNIFICANT CHANGE UP (ref 1.8–2.4)
MCHC RBC-ENTMCNC: 30.9 PG — SIGNIFICANT CHANGE UP (ref 27–31)
MCHC RBC-ENTMCNC: 31.6 PG — HIGH (ref 27–31)
MCHC RBC-ENTMCNC: 31.6 PG — HIGH (ref 27–31)
MCHC RBC-ENTMCNC: 34.6 G/DL — SIGNIFICANT CHANGE UP (ref 32–37)
MCHC RBC-ENTMCNC: 35.4 G/DL — SIGNIFICANT CHANGE UP (ref 32–37)
MCHC RBC-ENTMCNC: 35.8 G/DL — SIGNIFICANT CHANGE UP (ref 32–37)
MCV RBC AUTO: 88.4 FL — SIGNIFICANT CHANGE UP (ref 80–94)
MCV RBC AUTO: 89.3 FL — SIGNIFICANT CHANGE UP (ref 80–94)
MCV RBC AUTO: 89.4 FL — SIGNIFICANT CHANGE UP (ref 80–94)
MONOCYTES # BLD AUTO: 0.99 K/UL — HIGH (ref 0.1–0.6)
MONOCYTES # BLD AUTO: 1.07 K/UL — HIGH (ref 0.1–0.6)
MONOCYTES NFR BLD AUTO: 10.3 % — HIGH (ref 1.7–9.3)
MONOCYTES NFR BLD AUTO: 9.4 % — HIGH (ref 1.7–9.3)
NEUTROPHILS # BLD AUTO: 7.3 K/UL — HIGH (ref 1.4–6.5)
NEUTROPHILS # BLD AUTO: 7.43 K/UL — HIGH (ref 1.4–6.5)
NEUTROPHILS NFR BLD AUTO: 70.1 % — SIGNIFICANT CHANGE UP (ref 42.2–75.2)
NEUTROPHILS NFR BLD AUTO: 70.7 % — SIGNIFICANT CHANGE UP (ref 42.2–75.2)
NRBC # BLD: 0 /100 WBCS — SIGNIFICANT CHANGE UP (ref 0–0)
PLATELET # BLD AUTO: 193 K/UL — SIGNIFICANT CHANGE UP (ref 130–400)
PLATELET # BLD AUTO: 194 K/UL — SIGNIFICANT CHANGE UP (ref 130–400)
PLATELET # BLD AUTO: 203 K/UL — SIGNIFICANT CHANGE UP (ref 130–400)
POTASSIUM SERPL-MCNC: 4.4 MMOL/L — SIGNIFICANT CHANGE UP (ref 3.5–5)
POTASSIUM SERPL-SCNC: 4.4 MMOL/L — SIGNIFICANT CHANGE UP (ref 3.5–5)
PROT SERPL-MCNC: 5.7 G/DL — LOW (ref 6–8)
RBC # BLD: 4.21 M/UL — LOW (ref 4.7–6.1)
RBC # BLD: 4.24 M/UL — LOW (ref 4.7–6.1)
RBC # BLD: 4.4 M/UL — LOW (ref 4.7–6.1)
RBC # FLD: 12.5 % — SIGNIFICANT CHANGE UP (ref 11.5–14.5)
RBC # FLD: 12.6 % — SIGNIFICANT CHANGE UP (ref 11.5–14.5)
RBC # FLD: 12.7 % — SIGNIFICANT CHANGE UP (ref 11.5–14.5)
SODIUM SERPL-SCNC: 138 MMOL/L — SIGNIFICANT CHANGE UP (ref 135–146)
TROPONIN T SERPL-MCNC: 0.32 NG/ML — CRITICAL HIGH
TROPONIN T SERPL-MCNC: 0.34 NG/ML — CRITICAL HIGH
WBC # BLD: 10.41 K/UL — SIGNIFICANT CHANGE UP (ref 4.8–10.8)
WBC # BLD: 10.51 K/UL — SIGNIFICANT CHANGE UP (ref 4.8–10.8)
WBC # BLD: 7.78 K/UL — SIGNIFICANT CHANGE UP (ref 4.8–10.8)
WBC # FLD AUTO: 10.41 K/UL — SIGNIFICANT CHANGE UP (ref 4.8–10.8)
WBC # FLD AUTO: 10.51 K/UL — SIGNIFICANT CHANGE UP (ref 4.8–10.8)
WBC # FLD AUTO: 7.78 K/UL — SIGNIFICANT CHANGE UP (ref 4.8–10.8)

## 2022-01-27 PROCEDURE — 99233 SBSQ HOSP IP/OBS HIGH 50: CPT

## 2022-01-27 PROCEDURE — 93010 ELECTROCARDIOGRAM REPORT: CPT

## 2022-01-27 RX ORDER — METOPROLOL TARTRATE 50 MG
25 TABLET ORAL
Refills: 0 | Status: DISCONTINUED | OUTPATIENT
Start: 2022-01-27 | End: 2022-01-27

## 2022-01-27 RX ORDER — MAGNESIUM SULFATE 500 MG/ML
2 VIAL (ML) INJECTION ONCE
Refills: 0 | Status: COMPLETED | OUTPATIENT
Start: 2022-01-27 | End: 2022-01-27

## 2022-01-27 RX ORDER — TICAGRELOR 90 MG/1
1 TABLET ORAL
Qty: 60 | Refills: 0
Start: 2022-01-27 | End: 2022-02-25

## 2022-01-27 RX ORDER — METOPROLOL TARTRATE 50 MG
12.5 TABLET ORAL ONCE
Refills: 0 | Status: COMPLETED | OUTPATIENT
Start: 2022-01-27 | End: 2022-01-27

## 2022-01-27 RX ORDER — METOPROLOL TARTRATE 50 MG
50 TABLET ORAL
Refills: 0 | Status: DISCONTINUED | OUTPATIENT
Start: 2022-01-27 | End: 2022-01-28

## 2022-01-27 RX ORDER — ISOSORBIDE MONONITRATE 60 MG/1
30 TABLET, EXTENDED RELEASE ORAL DAILY
Refills: 0 | Status: DISCONTINUED | OUTPATIENT
Start: 2022-01-27 | End: 2022-01-28

## 2022-01-27 RX ORDER — INFLUENZA VIRUS VACCINE 15; 15; 15; 15 UG/.5ML; UG/.5ML; UG/.5ML; UG/.5ML
0.5 SUSPENSION INTRAMUSCULAR ONCE
Refills: 0 | Status: DISCONTINUED | OUTPATIENT
Start: 2022-01-27 | End: 2022-01-28

## 2022-01-27 RX ADMIN — Medication 12.5 MILLIGRAM(S): at 09:14

## 2022-01-27 RX ADMIN — Medication 25 GRAM(S): at 06:22

## 2022-01-27 RX ADMIN — Medication 12.5 MILLIGRAM(S): at 05:46

## 2022-01-27 RX ADMIN — TICAGRELOR 90 MILLIGRAM(S): 90 TABLET ORAL at 11:14

## 2022-01-27 RX ADMIN — EPTIFIBATIDE 12 MICROGRAM(S)/KG/MIN: 2 INJECTION, SOLUTION INTRAVENOUS at 06:22

## 2022-01-27 RX ADMIN — Medication 50 MILLIGRAM(S): at 17:29

## 2022-01-27 RX ADMIN — ISOSORBIDE MONONITRATE 30 MILLIGRAM(S): 60 TABLET, EXTENDED RELEASE ORAL at 22:16

## 2022-01-27 RX ADMIN — Medication 81 MILLIGRAM(S): at 11:16

## 2022-01-27 RX ADMIN — PANTOPRAZOLE SODIUM 40 MILLIGRAM(S): 20 TABLET, DELAYED RELEASE ORAL at 05:46

## 2022-01-27 RX ADMIN — CHLORHEXIDINE GLUCONATE 1 APPLICATION(S): 213 SOLUTION TOPICAL at 05:46

## 2022-01-27 RX ADMIN — ATORVASTATIN CALCIUM 80 MILLIGRAM(S): 80 TABLET, FILM COATED ORAL at 22:16

## 2022-01-27 NOTE — PATIENT PROFILE ADULT - FALL HARM RISK - HARM RISK INTERVENTIONS

## 2022-01-27 NOTE — PROGRESS NOTE ADULT - SUBJECTIVE AND OBJECTIVE BOX
PATIENT:  JOHN LOMBARDI  [id number]    CHIEF COMPLAINT:  Patient is a 61y old  Male who presents with a chief complaint of Chest Pain (26 Jan 2022 10:30)      INTERVAL HISTORY/OVERNIGHT EVENTS:      REVIEW OF SYSTEMS:    Constitutional:     [ ] negative [ ] fevers [ ] chills [ ] weight loss [ ] weight gain  HEENT:                  [ ] negative [ ] dry eyes [ ] eye irritation [ ] postnasal drip [ ] nasal congestion  CV:                         [ ] negative  [ ] chest pain [ ] orthopnea [ ] palpitations [ ] murmur  Resp:                     [ ] negative [ ] cough [ ] shortness of breath [ ] dyspnea [ ] wheezing [ ] sputum [ ] hemoptysis  GI:                          [ ] negative [ ] nausea [ ] vomiting [ ] diarrhea [ ] constipation [ ] abd pain [ ] dysphagia   :                        [ ] negative [ ] dysuria [ ] nocturia [ ] hematuria [ ] increased urinary frequency  Musculoskeletal: [ ] negative [ ] back pain [ ] myalgias [ ] arthralgias [ ] fracture  Skin:                       [ ] negative [ ] rash [ ] itch  Neurological:        [ ] negative [ ] headache [ ] dizziness [ ] syncope [ ] weakness [ ] numbness  Psychiatric:           [ ] negative [ ] anxiety [ ] depression  Endocrine:            [ ] negative [ ] diabetes [ ] thyroid problem  Heme/Lymph:      [ ] negative [ ] anemia [ ] bleeding problem  Allergic/Immune: [ ] negative [ ] itchy eyes [ ] nasal discharge [ ] hives [ ] angioedema    [ ] All other systems negative  [ ] Unable to assess ROS because ________.    MEDICATIONS:  MEDICATIONS  (STANDING):  aspirin  chewable 81 milliGRAM(s) Oral daily  atorvastatin 80 milliGRAM(s) Oral at bedtime  chlorhexidine 4% Liquid 1 Application(s) Topical <User Schedule>  eptifibatide Infusion 75 mg/100 mL 2 MICROgram(s)/kG/Min (12 mL/Hr) IV Continuous <Continuous>  influenza   Vaccine 0.5 milliLiter(s) IntraMuscular once  metoprolol tartrate 12.5 milliGRAM(s) Oral two times a day  nitroglycerin  Infusion 10 MICROgram(s)/Min (3 mL/Hr) IV Continuous <Continuous>  pantoprazole    Tablet 40 milliGRAM(s) Oral before breakfast  ticagrelor 90 milliGRAM(s) Oral every 12 hours    MEDICATIONS  (PRN):      ALLERGIES:  Allergies    No Known Drug Allergies  shellfish (Anaphylaxis)    Intolerances        OBJECTIVE:  ICU Vital Signs Last 24 Hrs  T(C): 36.6 (27 Jan 2022 04:00), Max: 36.9 (26 Jan 2022 20:00)  T(F): 97.8 (27 Jan 2022 04:00), Max: 98.4 (26 Jan 2022 20:00)  HR: 84 (27 Jan 2022 04:00) (82 - 110)  BP: 91/54 (27 Jan 2022 04:00) (91/54 - 146/96)  BP(mean): 66 (27 Jan 2022 04:00) (66 - 117)  ABP: --  ABP(mean): --  RR: 14 (26 Jan 2022 21:00) (14 - 48)  SpO2: 95% (27 Jan 2022 04:00) (94% - 98%)      Adult Advanced Hemodynamics Last 24 Hrs  CVP(mm Hg): --  CVP(cm H2O): --  CO: --  CI: --  PA: --  PA(mean): --  PCWP: --  SVR: --  SVRI: --  PVR: --  PVRI: --  CAPILLARY BLOOD GLUCOSE        CAPILLARY BLOOD GLUCOSE        I&O's Summary    26 Jan 2022 07:01  -  27 Jan 2022 07:00  --------------------------------------------------------  IN: 150 mL / OUT: 840 mL / NET: -690 mL      Daily     Daily     PHYSICAL EXAMINATION:  General: WN/WD NAD  HEENT: PERRLA, EOMI, moist mucous membranes  Neurology: A&Ox3, nonfocal, RICO x 4  Respiratory: CTA B/L, normal respiratory effort, no wheezes, crackles, rales  CV: RRR, S1S2, no murmurs, rubs or gallops  Abdominal: Soft, NT, ND +BS, Last BM  Extremities: No edema, + peripheral pulses  Incisions:   Tubes:    LABS:                          13.4   10.51 )-----------( 194      ( 27 Jan 2022 05:00 )             37.9     01-27    138  |  106  |  18  ----------------------------<  132<H>  4.4   |  18  |  0.9    Ca    8.7      27 Jan 2022 05:00  Mg     1.6     01-27    TPro  5.7<L>  /  Alb  3.4<L>  /  TBili  0.7  /  DBili  x   /  AST  33  /  ALT  17  /  AlkPhos  88  01-27    LIVER FUNCTIONS - ( 27 Jan 2022 05:00 )  Alb: 3.4 g/dL / Pro: 5.7 g/dL / ALK PHOS: 88 U/L / ALT: 17 U/L / AST: 33 U/L / GGT: x           PT/INR - ( 26 Jan 2022 12:34 )   PT: 11.40 sec;   INR: 0.99 ratio         PTT - ( 26 Jan 2022 12:34 )  PTT:35.9 sec  Troponin T, Serum: 0.32 ng/mL (01-27 @ 05:00)  Troponin T, Serum: 0.14 ng/mL (01-26 @ 12:34)  Troponin T, Serum: 0.08 ng/mL (01-26 @ 08:14)    CARDIAC MARKERS ( 27 Jan 2022 05:00 )  x     / 0.32 ng/mL / x     / x     / x      CARDIAC MARKERS ( 26 Jan 2022 12:34 )  x     / 0.14 ng/mL / x     / x     / x      CARDIAC MARKERS ( 26 Jan 2022 08:14 )  x     / 0.08 ng/mL / x     / x     / x              TELEMETRY:     EKG:     IMAGING:           PATIENT:  JOHN LOMBARDI  [id number]    CHIEF COMPLAINT:  Patient is a 61y old  Male who presents with a chief complaint of Chest Pain (26 Jan 2022 10:30)      INTERVAL HISTORY/OVERNIGHT EVENTS:      REVIEW OF SYSTEMS:    Constitutional:     [x ] negative [ ] fevers [ ] chills [ ] weight loss [ ] weight gain  HEENT:                  [ ] negative [ ] dry eyes [ ] eye irritation [ ] postnasal drip [ ] nasal congestion  CV:                         [x ] negative  [ ] chest pain [ ] orthopnea [ ] palpitations [ ] murmur  Resp:                     [x ] negative [ ] cough [ ] shortness of breath [ ] dyspnea [ ] wheezing [ ] sputum [ ] hemoptysis  GI:                          [x ] negative [ ] nausea [ ] vomiting [ ] diarrhea [ ] constipation [ ] abd pain [ ] dysphagia   :                        [x ] negative [ ] dysuria [ ] nocturia [ ] hematuria [ ] increased urinary frequency  Musculoskeletal: [ ] negative [ ] back pain [ ] myalgias [ ] arthralgias [ ] fracture  Skin:                       [ ] negative [ ] rash [ ] itch  Neurological:        [ ] negative [ ] headache [ ] dizziness [ ] syncope [ ] weakness [ ] numbness  Psychiatric:           [ ] negative [ ] anxiety [ ] depression  Endocrine:            [ ] negative [ ] diabetes [ ] thyroid problem  Heme/Lymph:      [ ] negative [ ] anemia [ ] bleeding problem  Allergic/Immune: [ ] negative [ ] itchy eyes [ ] nasal discharge [ ] hives [ ] angioedema    [x ] All other systems negative  [ ] Unable to assess ROS because ________.    MEDICATIONS:  MEDICATIONS  (STANDING):  aspirin  chewable 81 milliGRAM(s) Oral daily  atorvastatin 80 milliGRAM(s) Oral at bedtime  chlorhexidine 4% Liquid 1 Application(s) Topical <User Schedule>  eptifibatide Infusion 75 mg/100 mL 2 MICROgram(s)/kG/Min (12 mL/Hr) IV Continuous <Continuous>  influenza   Vaccine 0.5 milliLiter(s) IntraMuscular once  metoprolol tartrate 12.5 milliGRAM(s) Oral two times a day  nitroglycerin  Infusion 10 MICROgram(s)/Min (3 mL/Hr) IV Continuous <Continuous>  pantoprazole    Tablet 40 milliGRAM(s) Oral before breakfast  ticagrelor 90 milliGRAM(s) Oral every 12 hours    MEDICATIONS  (PRN):      ALLERGIES:  Allergies    No Known Drug Allergies  shellfish (Anaphylaxis)    Intolerances        OBJECTIVE:  ICU Vital Signs Last 24 Hrs  T(C): 36.6 (27 Jan 2022 04:00), Max: 36.9 (26 Jan 2022 20:00)  T(F): 97.8 (27 Jan 2022 04:00), Max: 98.4 (26 Jan 2022 20:00)  HR: 84 (27 Jan 2022 04:00) (82 - 110)  BP: 91/54 (27 Jan 2022 04:00) (91/54 - 146/96)  BP(mean): 66 (27 Jan 2022 04:00) (66 - 117)  ABP: --  ABP(mean): --  RR: 14 (26 Jan 2022 21:00) (14 - 48)  SpO2: 95% (27 Jan 2022 04:00) (94% - 98%)      Adult Advanced Hemodynamics Last 24 Hrs  CVP(mm Hg): --  CVP(cm H2O): --  CO: --  CI: --  PA: --  PA(mean): --  PCWP: --  SVR: --  SVRI: --  PVR: --  PVRI: --  CAPILLARY BLOOD GLUCOSE        CAPILLARY BLOOD GLUCOSE        I&O's Summary    26 Jan 2022 07:01  -  27 Jan 2022 07:00  --------------------------------------------------------  IN: 150 mL / OUT: 840 mL / NET: -690 mL      Daily     Daily     PHYSICAL EXAMINATION:        Incisions:   Tubes:    LABS:                          13.4   10.51 )-----------( 194      ( 27 Jan 2022 05:00 )             37.9     01-27    138  |  106  |  18  ----------------------------<  132<H>  4.4   |  18  |  0.9    Ca    8.7      27 Jan 2022 05:00  Mg     1.6     01-27    TPro  5.7<L>  /  Alb  3.4<L>  /  TBili  0.7  /  DBili  x   /  AST  33  /  ALT  17  /  AlkPhos  88  01-27    LIVER FUNCTIONS - ( 27 Jan 2022 05:00 )  Alb: 3.4 g/dL / Pro: 5.7 g/dL / ALK PHOS: 88 U/L / ALT: 17 U/L / AST: 33 U/L / GGT: x           PT/INR - ( 26 Jan 2022 12:34 )   PT: 11.40 sec;   INR: 0.99 ratio         PTT - ( 26 Jan 2022 12:34 )  PTT:35.9 sec  Troponin T, Serum: 0.32 ng/mL (01-27 @ 05:00)  Troponin T, Serum: 0.14 ng/mL (01-26 @ 12:34)  Troponin T, Serum: 0.08 ng/mL (01-26 @ 08:14)    CARDIAC MARKERS ( 27 Jan 2022 05:00 )  x     / 0.32 ng/mL / x     / x     / x      CARDIAC MARKERS ( 26 Jan 2022 12:34 )  x     / 0.14 ng/mL / x     / x     / x      CARDIAC MARKERS ( 26 Jan 2022 08:14 )  x     / 0.08 ng/mL / x     / x     / x              TELEMETRY:     EKG:     IMAGING:           PATIENT:  JOHN LOMBARDI  [id number]    CHIEF COMPLAINT:  Patient is a 61y old  Male who presents with a chief complaint of Chest Pain (26 Jan 2022 10:30)      INTERVAL HISTORY/OVERNIGHT EVENTS:  Underwent cardiac cath 1/26 with 2 stents placed in RCA.   Was on integrilin, ASA, and brillinta overnight. Was on Nitroglycerin overnight.     REVIEW OF SYSTEMS:    Constitutional:     [x ] negative [ ] fevers [ ] chills [ ] weight loss [ ] weight gain  HEENT:                  [ ] negative [ ] dry eyes [ ] eye irritation [ ] postnasal drip [ ] nasal congestion  CV:                         [x ] negative  [ ] chest pain [ ] orthopnea [ ] palpitations [ ] murmur  Resp:                     [x ] negative [ ] cough [ ] shortness of breath [ ] dyspnea [ ] wheezing [ ] sputum [ ] hemoptysis  GI:                          [x ] negative [ ] nausea [ ] vomiting [ ] diarrhea [ ] constipation [ ] abd pain [ ] dysphagia   :                        [x ] negative [ ] dysuria [ ] nocturia [ ] hematuria [ ] increased urinary frequency  Musculoskeletal: [ ] negative [ ] back pain [ ] myalgias [ ] arthralgias [ ] fracture  Skin:                       [ ] negative [ ] rash [ ] itch  Neurological:        [ ] negative [ ] headache [ ] dizziness [ ] syncope [ ] weakness [ ] numbness  Psychiatric:           [ ] negative [ ] anxiety [ ] depression  Endocrine:            [ ] negative [ ] diabetes [ ] thyroid problem  Heme/Lymph:      [ ] negative [ ] anemia [ ] bleeding problem  Allergic/Immune: [ ] negative [ ] itchy eyes [ ] nasal discharge [ ] hives [ ] angioedema    [x ] All other systems negative  [ ] Unable to assess ROS because ________.    MEDICATIONS:  MEDICATIONS  (STANDING):  aspirin  chewable 81 milliGRAM(s) Oral daily  atorvastatin 80 milliGRAM(s) Oral at bedtime  chlorhexidine 4% Liquid 1 Application(s) Topical <User Schedule>  eptifibatide Infusion 75 mg/100 mL 2 MICROgram(s)/kG/Min (12 mL/Hr) IV Continuous <Continuous>  influenza   Vaccine 0.5 milliLiter(s) IntraMuscular once  metoprolol tartrate 12.5 milliGRAM(s) Oral two times a day  nitroglycerin  Infusion 10 MICROgram(s)/Min (3 mL/Hr) IV Continuous <Continuous>  pantoprazole    Tablet 40 milliGRAM(s) Oral before breakfast  ticagrelor 90 milliGRAM(s) Oral every 12 hours    MEDICATIONS  (PRN):      ALLERGIES:  Allergies    No Known Drug Allergies  shellfish (Anaphylaxis)    Intolerances        OBJECTIVE:  ICU Vital Signs Last 24 Hrs  T(C): 36.6 (27 Jan 2022 04:00), Max: 36.9 (26 Jan 2022 20:00)  T(F): 97.8 (27 Jan 2022 04:00), Max: 98.4 (26 Jan 2022 20:00)  HR: 84 (27 Jan 2022 04:00) (82 - 110)  BP: 91/54 (27 Jan 2022 04:00) (91/54 - 146/96)  BP(mean): 66 (27 Jan 2022 04:00) (66 - 117)  ABP: --  ABP(mean): --  RR: 14 (26 Jan 2022 21:00) (14 - 48)  SpO2: 95% (27 Jan 2022 04:00) (94% - 98%)      Adult Advanced Hemodynamics Last 24 Hrs  CVP(mm Hg): --  CVP(cm H2O): --  CO: --  CI: --  PA: --  PA(mean): --  PCWP: --  SVR: --  SVRI: --  PVR: --  PVRI: --  CAPILLARY BLOOD GLUCOSE        CAPILLARY BLOOD GLUCOSE        I&O's Summary    26 Jan 2022 07:01  -  27 Jan 2022 07:00  --------------------------------------------------------  IN: 150 mL / OUT: 840 mL / NET: -690 mL      PHYSICAL EXAMINATION:  Constitutional: pt is comfortable sitting in bed; no acute distress; well-groomed  HEENT: Full ROM in bilateral eyes; pupils symmetrical and equal in size; neck supple  Respiratory: (+) sounds in all lung fields; no crackles or wheezes appreciated  Cardiovascular: S1 S2 regular rate and rhythm; no murmurs or gallops appreciated  Gastrointestinal: (+) bowel sounds in all quadrants; abdomen is non-distended, non-tender to superficial and deep palpation  Extremities: extremities are non-edematous  Vascular: Warm and Well perfused UE and LE; no mottling or dusky skin   Neurological: AxO x3 to self, place and year  Skin: no rashes or ulcerations noted; no scaling present      Incisions: none  Tubes: Woods     LABS:                          13.4   10.51 )-----------( 194      ( 27 Jan 2022 05:00 )             37.9     01-27    138  |  106  |  18  ----------------------------<  132<H>  4.4   |  18  |  0.9    Ca    8.7      27 Jan 2022 05:00  Mg     1.6     01-27    TPro  5.7<L>  /  Alb  3.4<L>  /  TBili  0.7  /  DBili  x   /  AST  33  /  ALT  17  /  AlkPhos  88  01-27    LIVER FUNCTIONS - ( 27 Jan 2022 05:00 )  Alb: 3.4 g/dL / Pro: 5.7 g/dL / ALK PHOS: 88 U/L / ALT: 17 U/L / AST: 33 U/L / GGT: x           PT/INR - ( 26 Jan 2022 12:34 )   PT: 11.40 sec;   INR: 0.99 ratio         PTT - ( 26 Jan 2022 12:34 )  PTT:35.9 sec  Troponin T, Serum: 0.32 ng/mL (01-27 @ 05:00)  Troponin T, Serum: 0.14 ng/mL (01-26 @ 12:34)  Troponin T, Serum: 0.08 ng/mL (01-26 @ 08:14)    CARDIAC MARKERS ( 27 Jan 2022 05:00 )  x     / 0.32 ng/mL / x     / x     / x      CARDIAC MARKERS ( 26 Jan 2022 12:34 )  x     / 0.14 ng/mL / x     / x     / x      CARDIAC MARKERS ( 26 Jan 2022 08:14 )  x     / 0.08 ng/mL / x     / x     / x        TELEMETRY:   No events noted   EKG: < from: 12 Lead ECG (01.27.22 @ 04:51) >  Diagnosis Line Normal sinus rhythm  Normal ECG    < end of copied text >      IMAGING:< from: Xray Chest 1 View-PORTABLE IMMEDIATE (01.26.22 @ 08:23) >  Impression:    No radiographic evidence of acute cardiopulmonary disease.    < end of copied text >

## 2022-01-27 NOTE — PROGRESS NOTE ADULT - ASSESSMENT
Patient is a 62yo male with PMH of arthritis, hx of diverticulitis s/p partial colon resection, presents for chest pain of 2 days.   In the ED: Vitals: Afebrile, /82, , RR 17, 97% RA, CBC wnl, CMP with mild alk phos elevation 126, Mg 1.8, repleted, Trop elevated 0.08. EKG with new T wave inversions Lead III, AVF, V6, new compared to EKG in 2016. . CHest Xray no cardiomegaly, clear.  (26 Jan 2022 10:30)  Underwent cardiac cath on 1/26 with two stents to RCA.   Planned for staged PCI outpatient.     CNS: AxO x3     HEENT:   - routine oral care    Pulmonary:   - no O2 requirements, on room air     Cardiology:   - s/p two stents to RCA, complicated by tortuous LAD with small dissection   - required Integrilin, Ticagrelor, ASA after procedure --> Integrilin d/c 1/27 7am   - s/p nitro drip for pain  - increase Metoprolol to 50mg BID, monitor HR/BP   - can start Imdur 1/27 afternoon, if metoprolol is tolerated   - monitor on Ticagrelor + ASA for 24 hours; if tolerated, can be discharged for outpatient staged PCI (LAD will need time to heal)    - f/u repeat echo post cath   - c/w Atorvastatin 80mg daily (Lipid panel showed LDL >130, HDL <40)    GI:  - monitor BMs  - c/w Protonix 40mg qd     :  - no changes     ENDOCRINE:   - newly diagnosed DMII, A1C 7.2%   - may need additional medications on discharge   - POCT qhs and ISS/basal as needed     HEME:   - c/w ticagrelor and ASA         Patient is a 60yo male with PMH of arthritis, hx of diverticulitis s/p partial colon resection, presents for chest pain of 2 days.   In the ED: Vitals: Afebrile, /82, , RR 17, 97% RA, CBC wnl, CMP with mild alk phos elevation 126, Mg 1.8, repleted, Trop elevated 0.08. EKG with new T wave inversions Lead III, AVF, V6, new compared to EKG in 2016. . CHest Xray no cardiomegaly, clear.  (26 Jan 2022 10:30)  Underwent cardiac cath on 1/26 with two stents to RCA.   Planned for staged PCI outpatient.     # NSTEMI   # DLD  # Newly Diagnosed DM II     CNS: AxO x3     HEENT:   - routine oral care    Pulmonary:   - no O2 requirements, on room air     Cardiology:   - s/p two stents to RCA, complicated by tortuous LAD with small dissection   - required Integrilin, Ticagrelor, ASA after procedure --> Integrilin d/c 1/27 7am   - s/p nitro drip for pain  - increase Metoprolol to 50mg BID, monitor HR/BP   - can start Imdur 1/27 afternoon, if metoprolol is tolerated   - monitor on Ticagrelor + ASA for 24 hours; if tolerated, can be discharged for outpatient staged PCI (LAD will need time to heal)    - f/u repeat echo post cath   - c/w Atorvastatin 80mg daily (Lipid panel showed LDL >130, HDL <40)    GI:  - monitor BMs  - c/w Protonix 40mg qd     :  - no changes     ENDOCRINE:   - newly diagnosed DMII, A1C 7.2%   - may need additional medications on discharge   - POCT qhs and ISS/basal as needed     HEME:   - c/w ticagrelor and ASA         Patient is a 60yo male with PMH of arthritis, hx of diverticulitis s/p partial colon resection, presents for chest pain of 2 days.   In the ED: Vitals: Afebrile, /82, , RR 17, 97% RA, CBC wnl, CMP with mild alk phos elevation 126, Mg 1.8, repleted, Trop elevated 0.08. EKG with new T wave inversions Lead III, AVF, V6, new compared to EKG in 2016. . CHest Xray no cardiomegaly, clear.  (26 Jan 2022 10:30)  Underwent cardiac cath on 1/26 with two stents to RCA.   Planned for staged PCI outpatient.     # NSTEMI s//p RCA PCI  # Staged PCI to LAD planned  # DLD  # Newly Diagnosed DM II     CNS: AxO x3     HEENT:   - routine oral care    Pulmonary:   - no O2 requirements, on room air     Cardiology:   - s/p two stents to RCA  - required Integrilin, Ticagrelor, ASA after procedure --> Integrilin d/c 1/27 7am   - s/p nitro drip for pain  - increase Metoprolol to 25mg BID, monitor HR/BP   - can start Imdur 1/27 afternoon, if metoprolol is tolerated   - monitor on Ticagrelor + ASA for 24 hours; if tolerated, can be discharged for outpatient staged PCI (LAD will need time to heal)    - f/u echo post cath   - c/w Atorvastatin 80mg daily (Lipid panel showed LDL >130, HDL <40)    GI:  - monitor BMs  - c/w Protonix 40mg qd     :  - no changes     ENDOCRINE:   - newly diagnosed DMII, A1C 7.2%   - may need additional medications on discharge   - POCT qhs and ISS/basal as needed     HEME:   - c/w ticagrelor and ASA    CCU monitoring  Possible D/C tomorrow

## 2022-01-27 NOTE — PROGRESS NOTE ADULT - SUBJECTIVE AND OBJECTIVE BOX
Cardiology Follow up    LOMBARDI, JOHN   61y Male  PAST MEDICAL & SURGICAL HISTORY:  Arthritis    History of colon resection         HPI:  Patient is a 60yo male with PMH of arthritis, hx of diverticulitis s/p partial colon resection, presents for chest pain of 2 days. Chest pain started yesterday while working . Central, pressure-like pain, non-radiating, alleviated yesterday with rest. Reports chronic numbness in both arms due to arhtritis. This am, patient had chest pain again on his way to the bus stop (4 blocks from home). Worse then yesterday. He went to work, pain did not resolve, was brought back home by his boss, alleviated after 2 hrs. Was brought in to the hospital by his wife. Chest pain resolved by the time he presented to the ED. Currently, he denies chest pain, palpitations, dizziness, fevers, chills, N/V/D, abdominal pain, Le edema. He does not follow a cardiologist. Denies hx of HTN, HLD or previous MI. Does not take any medications currently. Reports he had a cardiac cath done 30 years ago, at that time, he was using cocaine and had presented to the hospital for chest pain as well. Patient reports that the cath report was normal. Currently he denies tobacco use, cocaine use. Uses marijuana daily. Hx of tobacco use, stopped 10 years ago, 2p/day for 35 years. Social alcohol drinker. Family history of MI in father at age 40,  at 50s from MI. Mother with PPM, AICD,  at age 91 from cancer. Of note patient reports having COVID one month ago.     In the ED: Vitals: Afebrile, /82, , RR 17, 97% RA, CBC wnl, CMP with mild alk phos elevation 126, Mg 1.8, repleted, Trop elevated 0.08. EKG with new T wave inversions Lead III, AVF, V6, new compared to EKG in 2016. . CHest Xray no cardiomegaly, clear.  (2022 10:30)    Allergies    No Known Drug Allergies  shellfish (Anaphylaxis)    Intolerances    Patient examined at bedside  Patient without complaints. Pt ambulated without issues/symptoms  Denies CP, SOB, palpitations, or dizziness  No events on telemetry overnight    Vital Signs Last 24 Hrs  T(C): 36.6 (2022 08:00), Max: 36.9 (2022 20:00)  T(F): 97.8 (2022 08:00), Max: 98.4 (2022 20:00)  HR: 100 (2022 09:00) (81 - 110)  BP: 128/80 (2022 09:00) (91/54 - 141/94)  BP(mean): 97 (2022 09:00) (66 - 112)  RR: 18 (2022 09:00) (14 - 48)  SpO2: 95% (2022 09:00) (94% - 97%)    MEDICATIONS  (STANDING):  aspirin  chewable 81 milliGRAM(s) Oral daily  atorvastatin 80 milliGRAM(s) Oral at bedtime  chlorhexidine 4% Liquid 1 Application(s) Topical <User Schedule>  influenza   Vaccine 0.5 milliLiter(s) IntraMuscular once  metoprolol tartrate 25 milliGRAM(s) Oral two times a day  pantoprazole    Tablet 40 milliGRAM(s) Oral before breakfast  ticagrelor 90 milliGRAM(s) Oral every 12 hours    MEDICATIONS  (PRN):    REVIEW OF SYSTEMS:          All negative except as mentioned in HPI    PHYSICAL EXAM:           CONSTITUTIONAL: Well-developed; well-nourished; in no acute distress  	SKIN: warm, dry  	HEAD: Normocephalic; atraumatic  	EYES: PERRL.  	ENT: No nasal discharge, airway clear, mucous membranes moist  	NECK: Supple; non tender.  	CARD: +S1, +S2, no murmurs, gallops, or rubs. Regular rate and rhythm    	RESP: No wheezes, rales or rhonchi. CTA B/L  	ABD: soft ntnd, + BS x 4 quadrants  	EXT: moves all extremities,  no clubbing, cyanosis or edema  	NEURO: Alert and oriented x3, no focal deficits          PSYCH: Cooperative, appropriate          VASCULAR:  + Rad / + PTs / + DPs          EXTREMITY:             Right Radial: pressure dressing removed, access site soft, no hematoma, no pain, + pulses, no sign of infection, no numbness            ECG: < from: 12 Lead ECG (22 @ 04:51) >    Ventricular Rate 85 BPM    Atrial Rate 85 BPM    P-R Interval 136 ms    QRS Duration 84 ms    Q-T Interval 384 ms    QTC Calculation(Bazett) 456 ms    P Axis 37 degrees    R Axis 11 degrees    T Axis 24 degrees    Diagnosis Line Normal sinus rhythm  Normal ECG                                                                                                                  2D ECHO: performed, results pending      LABS:                        13.4   10.51 )-----------( 194      ( 2022 05:00 )             37.9         138  |  106  |  18  ----------------------------<  132<H>  4.4   |  18  |  0.9    Ca    8.7      2022 05:00  Mg     1.6         TPro  5.7<L>  /  Alb  3.4<L>  /  TBili  0.7  /  DBili  x   /  AST  33  /  ALT  17  /  AlkPhos  88      CARDIAC MARKERS ( 2022 05:00 )  x     / 0.32 ng/mL / x     / x     / x      CARDIAC MARKERS ( 2022 12:34 )  x     / 0.14 ng/mL / x     / x     / x      CARDIAC MARKERS ( 2022 08:14 )  x     / 0.08 ng/mL / x     / x     / x          Magnesium, Serum: 1.6 mg/dL *L* [1.8 - 2.4] (22 @ 05:00)  LIVER FUNCTIONS - ( 2022 05:00 )  Alb: 3.4 g/dL / Pro: 5.7 g/dL / ALK PHOS: 88 U/L / ALT: 17 U/L / AST: 33 U/L / GGT: x             A/P:  I discussed the case with Cardiologist Dr. Prado and recommend the following:    S/P PCI :    POST-OP DIAGNOSIS:    significant triple vessel disease, syntax 16    Intervention: PCI ANALIA x 2 Resolute to mRCA and dRCA stenosis    	     Continue DAPT ( Aspirin 81 mg PO Daily and Brilinta 90mg po twice a day ),  B-Blocker, Statin Therapy                   Patient pharmacy called in for Brilinta  prescription and it is $5 per month, patient aware and agreeable, Rx available for pickup                   GDMT for additional lesions at this time                   OOB ambulate                   ADD ACEi if BP increases after ambulation, otherwise will re-evaluate as outpatient                   DM management as per CCU team, elevated A1C                   Monitor access site                   Patient agreeing to take DAPT for at least one year or as directed by cardiologist                    Pt given instructions on importance of taking antiplatelet medication or risk acute stent thrombosis/death                   Post cath instructions, access site care and activity restrictions reviewed with patient                     Discussed with patient to return to hospital if experience chest pain, shortness breath, dizziness and site bleeding                   Aggressive risk factor modification, diet counseling, smoking cessation discussed with patient                    Cardiac rehab info provided/referral and communication to cardiac rehab provided                    Staged PCI to LAD as outpatient in 4-6 weeks                    CCU monitoring, possible d/c tomorrow morning

## 2022-01-28 ENCOUNTER — TRANSCRIPTION ENCOUNTER (OUTPATIENT)
Age: 62
End: 2022-01-28

## 2022-01-28 VITALS
SYSTOLIC BLOOD PRESSURE: 124 MMHG | RESPIRATION RATE: 24 BRPM | HEART RATE: 85 BPM | DIASTOLIC BLOOD PRESSURE: 84 MMHG | OXYGEN SATURATION: 95 %

## 2022-01-28 LAB
ALBUMIN SERPL ELPH-MCNC: 3.5 G/DL — SIGNIFICANT CHANGE UP (ref 3.5–5.2)
ALP SERPL-CCNC: 91 U/L — SIGNIFICANT CHANGE UP (ref 30–115)
ALT FLD-CCNC: 22 U/L — SIGNIFICANT CHANGE UP (ref 0–41)
ANION GAP SERPL CALC-SCNC: 10 MMOL/L — SIGNIFICANT CHANGE UP (ref 7–14)
AST SERPL-CCNC: 27 U/L — SIGNIFICANT CHANGE UP (ref 0–41)
BILIRUB SERPL-MCNC: 1 MG/DL — SIGNIFICANT CHANGE UP (ref 0.2–1.2)
BUN SERPL-MCNC: 18 MG/DL — SIGNIFICANT CHANGE UP (ref 10–20)
CALCIUM SERPL-MCNC: 8.6 MG/DL — SIGNIFICANT CHANGE UP (ref 8.5–10.1)
CHLORIDE SERPL-SCNC: 103 MMOL/L — SIGNIFICANT CHANGE UP (ref 98–110)
CO2 SERPL-SCNC: 18 MMOL/L — SIGNIFICANT CHANGE UP (ref 17–32)
CREAT SERPL-MCNC: 0.9 MG/DL — SIGNIFICANT CHANGE UP (ref 0.7–1.5)
GLUCOSE BLDC GLUCOMTR-MCNC: 134 MG/DL — HIGH (ref 70–99)
GLUCOSE SERPL-MCNC: 137 MG/DL — HIGH (ref 70–99)
HCT VFR BLD CALC: 37.6 % — LOW (ref 42–52)
HGB BLD-MCNC: 12.9 G/DL — LOW (ref 14–18)
MAGNESIUM SERPL-MCNC: 1.8 MG/DL — SIGNIFICANT CHANGE UP (ref 1.8–2.4)
MCHC RBC-ENTMCNC: 31 PG — SIGNIFICANT CHANGE UP (ref 27–31)
MCHC RBC-ENTMCNC: 34.3 G/DL — SIGNIFICANT CHANGE UP (ref 32–37)
MCV RBC AUTO: 90.4 FL — SIGNIFICANT CHANGE UP (ref 80–94)
NRBC # BLD: 0 /100 WBCS — SIGNIFICANT CHANGE UP (ref 0–0)
PLATELET # BLD AUTO: 196 K/UL — SIGNIFICANT CHANGE UP (ref 130–400)
POTASSIUM SERPL-MCNC: 4.4 MMOL/L — SIGNIFICANT CHANGE UP (ref 3.5–5)
POTASSIUM SERPL-SCNC: 4.4 MMOL/L — SIGNIFICANT CHANGE UP (ref 3.5–5)
PROT SERPL-MCNC: 5.8 G/DL — LOW (ref 6–8)
RBC # BLD: 4.16 M/UL — LOW (ref 4.7–6.1)
RBC # FLD: 12.7 % — SIGNIFICANT CHANGE UP (ref 11.5–14.5)
SODIUM SERPL-SCNC: 131 MMOL/L — LOW (ref 135–146)
WBC # BLD: 8.7 K/UL — SIGNIFICANT CHANGE UP (ref 4.8–10.8)
WBC # FLD AUTO: 8.7 K/UL — SIGNIFICANT CHANGE UP (ref 4.8–10.8)

## 2022-01-28 PROCEDURE — 93010 ELECTROCARDIOGRAM REPORT: CPT

## 2022-01-28 PROCEDURE — 99232 SBSQ HOSP IP/OBS MODERATE 35: CPT

## 2022-01-28 RX ORDER — ISOSORBIDE MONONITRATE 60 MG/1
1 TABLET, EXTENDED RELEASE ORAL
Qty: 30 | Refills: 0
Start: 2022-01-28 | End: 2022-02-26

## 2022-01-28 RX ORDER — METOPROLOL TARTRATE 50 MG
1 TABLET ORAL
Qty: 60 | Refills: 0
Start: 2022-01-28 | End: 2022-02-26

## 2022-01-28 RX ORDER — ATORVASTATIN CALCIUM 80 MG/1
1 TABLET, FILM COATED ORAL
Qty: 30 | Refills: 0
Start: 2022-01-28 | End: 2022-02-26

## 2022-01-28 RX ORDER — ASPIRIN/CALCIUM CARB/MAGNESIUM 324 MG
1 TABLET ORAL
Qty: 30 | Refills: 0
Start: 2022-01-28 | End: 2022-02-26

## 2022-01-28 RX ADMIN — TICAGRELOR 90 MILLIGRAM(S): 90 TABLET ORAL at 11:09

## 2022-01-28 RX ADMIN — Medication 81 MILLIGRAM(S): at 11:08

## 2022-01-28 RX ADMIN — CHLORHEXIDINE GLUCONATE 1 APPLICATION(S): 213 SOLUTION TOPICAL at 06:28

## 2022-01-28 RX ADMIN — Medication 50 MILLIGRAM(S): at 06:22

## 2022-01-28 RX ADMIN — TICAGRELOR 90 MILLIGRAM(S): 90 TABLET ORAL at 00:20

## 2022-01-28 RX ADMIN — PANTOPRAZOLE SODIUM 40 MILLIGRAM(S): 20 TABLET, DELAYED RELEASE ORAL at 06:22

## 2022-01-28 NOTE — DISCHARGE NOTE PROVIDER - PROVIDER TOKENS
PROVIDER:[TOKEN:[[id number]],FOLLOWUP:[2 weeks]],PROVIDER:[TOKEN:[[id number]],FOLLOWUP:[2 weeks]]

## 2022-01-28 NOTE — PROGRESS NOTE ADULT - SUBJECTIVE AND OBJECTIVE BOX
PATIENT:  JOHN LOMBARDI  [id number]    CHIEF COMPLAINT:  Patient is a 61y old  Male who presents with a chief complaint of Chest Pain (2022 10:26)      INTERVAL HISTORY/OVERNIGHT EVENTS:      REVIEW OF SYSTEMS:    Constitutional:     [ ] negative [ ] fevers [ ] chills [ ] weight loss [ ] weight gain  HEENT:                  [ ] negative [ ] dry eyes [ ] eye irritation [ ] postnasal drip [ ] nasal congestion  CV:                         [ ] negative  [ ] chest pain [ ] orthopnea [ ] palpitations [ ] murmur  Resp:                     [ ] negative [ ] cough [ ] shortness of breath [ ] dyspnea [ ] wheezing [ ] sputum [ ] hemoptysis  GI:                          [ ] negative [ ] nausea [ ] vomiting [ ] diarrhea [ ] constipation [ ] abd pain [ ] dysphagia   :                        [ ] negative [ ] dysuria [ ] nocturia [ ] hematuria [ ] increased urinary frequency  Musculoskeletal: [ ] negative [ ] back pain [ ] myalgias [ ] arthralgias [ ] fracture  Skin:                       [ ] negative [ ] rash [ ] itch  Neurological:        [ ] negative [ ] headache [ ] dizziness [ ] syncope [ ] weakness [ ] numbness  Psychiatric:           [ ] negative [ ] anxiety [ ] depression  Endocrine:            [ ] negative [ ] diabetes [ ] thyroid problem  Heme/Lymph:      [ ] negative [ ] anemia [ ] bleeding problem  Allergic/Immune: [ ] negative [ ] itchy eyes [ ] nasal discharge [ ] hives [ ] angioedema    [ ] All other systems negative  [ ] Unable to assess ROS because ________.    MEDICATIONS:  MEDICATIONS  (STANDING):  aspirin  chewable 81 milliGRAM(s) Oral daily  atorvastatin 80 milliGRAM(s) Oral at bedtime  chlorhexidine 4% Liquid 1 Application(s) Topical <User Schedule>  influenza   Vaccine 0.5 milliLiter(s) IntraMuscular once  isosorbide   mononitrate ER Tablet (IMDUR) 30 milliGRAM(s) Oral daily  metoprolol tartrate 50 milliGRAM(s) Oral two times a day  pantoprazole    Tablet 40 milliGRAM(s) Oral before breakfast  ticagrelor 90 milliGRAM(s) Oral every 12 hours    MEDICATIONS  (PRN):      ALLERGIES:  Allergies    No Known Drug Allergies  shellfish (Anaphylaxis)    Intolerances        OBJECTIVE:  ICU Vital Signs Last 24 Hrs  T(C): 36.7 (2022 04:00), Max: 36.9 (2022 16:00)  T(F): 98 (2022 04:00), Max: 98.4 (2022 16:00)  HR: 75 (2022 06:00) (75 - 102)  BP: 97/61 (2022 06:00) (94/59 - 141/85)  BP(mean): 74 (2022 06:00) (70 - 108)  ABP: --  ABP(mean): --  RR: 21 (2022 06:00) (15 - 28)  SpO2: 92% (2022 06:00) (92% - 98%)      Adult Advanced Hemodynamics Last 24 Hrs  CVP(mm Hg): --  CVP(cm H2O): --  CO: --  CI: --  PA: --  PA(mean): --  PCWP: --  SVR: --  SVRI: --  PVR: --  PVRI: --  CAPILLARY BLOOD GLUCOSE      POCT Blood Glucose.: 141 mg/dL (2022 22:15)  POCT Blood Glucose.: 127 mg/dL (2022 16:58)  POCT Blood Glucose.: 144 mg/dL (2022 12:20)    CAPILLARY BLOOD GLUCOSE      POCT Blood Glucose.: 141 mg/dL (2022 22:15)    I&O's Summary    2022 07:01  -  2022 07:00  --------------------------------------------------------  IN: 1060 mL / OUT: 1800 mL / NET: -740 mL      Daily Height in cm: 175.26 (2022 06:00)    Daily Weight in k.1 (2022 06:00)    PHYSICAL EXAMINATION:  General: WN/WD NAD  HEENT: PERRLA, EOMI, moist mucous membranes  Neurology: A&Ox3, nonfocal, RICO x 4  Respiratory: CTA B/L, normal respiratory effort, no wheezes, crackles, rales  CV: RRR, S1S2, no murmurs, rubs or gallops  Abdominal: Soft, NT, ND +BS, Last BM  Extremities: No edema, + peripheral pulses  Incisions:   Tubes:    LABS:                          12.9   8.70  )-----------( 196      ( 2022 05:00 )             37.6         131<L>  |  103  |  18  ----------------------------<  137<H>  4.4   |  18  |  0.9    Ca    8.6      2022 05:00  Mg     1.8         TPro  5.8<L>  /  Alb  3.5  /  TBili  1.0  /  DBili  x   /  AST  27  /  ALT  22  /  AlkPhos  91      LIVER FUNCTIONS - ( 2022 05:00 )  Alb: 3.5 g/dL / Pro: 5.8 g/dL / ALK PHOS: 91 U/L / ALT: 22 U/L / AST: 27 U/L / GGT: x           PT/INR - ( 2022 12:34 )   PT: 11.40 sec;   INR: 0.99 ratio         PTT - ( 2022 12:34 )  PTT:35.9 sec  Troponin T, Serum: 0.34 ng/mL ( @ 18:14)    CARDIAC MARKERS ( 2022 18:14 )  x     / 0.34 ng/mL / x     / x     / x      CARDIAC MARKERS ( 2022 05:00 )  x     / 0.32 ng/mL / x     / x     / x      CARDIAC MARKERS ( 2022 12:34 )  x     / 0.14 ng/mL / x     / x     / x              TELEMETRY:     EKG:     IMAGING:           PATIENT:  JOHN LOMBARDI  [id number]    CHIEF COMPLAINT:  Patient is a 61y old  Male who presents with a chief complaint of Chest Pain (2022 10:26)      INTERVAL HISTORY/OVERNIGHT EVENTS:  Comfortable in bed today. No complaints    REVIEW OF SYSTEMS:    Constitutional:     [ ] negative [ ] fevers [ ] chills [ ] weight loss [ ] weight gain  HEENT:                  [ ] negative [ ] dry eyes [ ] eye irritation [ ] postnasal drip [ ] nasal congestion  CV:                         [ ] negative  [ ] chest pain [ ] orthopnea [ ] palpitations [ ] murmur  Resp:                     [ ] negative [ ] cough [ ] shortness of breath [ ] dyspnea [ ] wheezing [ ] sputum [ ] hemoptysis  GI:                          [ ] negative [ ] nausea [ ] vomiting [ ] diarrhea [ ] constipation [ ] abd pain [ ] dysphagia   :                        [ ] negative [ ] dysuria [ ] nocturia [ ] hematuria [ ] increased urinary frequency  Musculoskeletal: [ ] negative [ ] back pain [ ] myalgias [ ] arthralgias [ ] fracture  Skin:                       [ ] negative [ ] rash [ ] itch  Neurological:        [ ] negative [ ] headache [ ] dizziness [ ] syncope [ ] weakness [ ] numbness  Psychiatric:           [ ] negative [ ] anxiety [ ] depression  Endocrine:            [ ] negative [ ] diabetes [ ] thyroid problem  Heme/Lymph:      [ ] negative [ ] anemia [ ] bleeding problem  Allergic/Immune: [ ] negative [ ] itchy eyes [ ] nasal discharge [ ] hives [ ] angioedema    [ ] All other systems negative  [ ] Unable to assess ROS because ________.    MEDICATIONS:  MEDICATIONS  (STANDING):  aspirin  chewable 81 milliGRAM(s) Oral daily  atorvastatin 80 milliGRAM(s) Oral at bedtime  chlorhexidine 4% Liquid 1 Application(s) Topical <User Schedule>  influenza   Vaccine 0.5 milliLiter(s) IntraMuscular once  isosorbide   mononitrate ER Tablet (IMDUR) 30 milliGRAM(s) Oral daily  metoprolol tartrate 50 milliGRAM(s) Oral two times a day  pantoprazole    Tablet 40 milliGRAM(s) Oral before breakfast  ticagrelor 90 milliGRAM(s) Oral every 12 hours    MEDICATIONS  (PRN):      ALLERGIES:  Allergies    No Known Drug Allergies  shellfish (Anaphylaxis)    Intolerances        OBJECTIVE:  ICU Vital Signs Last 24 Hrs  T(C): 36.7 (2022 04:00), Max: 36.9 (2022 16:00)  T(F): 98 (2022 04:00), Max: 98.4 (2022 16:00)  HR: 75 (2022 06:00) (75 - 102)  BP: 97/61 (2022 06:00) (94/59 - 141/85)  BP(mean): 74 (2022 06:00) (70 - 108)  ABP: --  ABP(mean): --  RR: 21 (2022 06:00) (15 - 28)  SpO2: 92% (2022 06:00) (92% - 98%)      Adult Advanced Hemodynamics Last 24 Hrs  CVP(mm Hg): --  CVP(cm H2O): --  CO: --  CI: --  PA: --  PA(mean): --  PCWP: --  SVR: --  SVRI: --  PVR: --  PVRI: --  CAPILLARY BLOOD GLUCOSE      POCT Blood Glucose.: 141 mg/dL (2022 22:15)  POCT Blood Glucose.: 127 mg/dL (2022 16:58)  POCT Blood Glucose.: 144 mg/dL (2022 12:20)    CAPILLARY BLOOD GLUCOSE      POCT Blood Glucose.: 141 mg/dL (2022 22:15)    I&O's Summary    2022 07:01  -  2022 07:00  --------------------------------------------------------  IN: 1060 mL / OUT: 1800 mL / NET: -740 mL      Daily Height in cm: 175.26 (2022 06:00)    Daily Weight in k.1 (2022 06:00)    PHYSICAL EXAMINATION:  General: WN/WD NAD  HEENT: PERRLA, EOMI, moist mucous membranes  Neurology: A&Ox3, nonfocal, RICO x 4  Respiratory: CTA B/L, normal respiratory effort, no wheezes, crackles, rales  CV: RRR, S1S2, no murmurs, rubs or gallops  Abdominal: Soft, NT, ND +BS, Last BM  Extremities: No edema, + peripheral pulses  Incisions:   Tubes:    LABS:                          12.9   8.70  )-----------( 196      ( 2022 05:00 )             37.6         131<L>  |  103  |  18  ----------------------------<  137<H>  4.4   |  18  |  0.9    Ca    8.6      2022 05:00  Mg     1.8         TPro  5.8<L>  /  Alb  3.5  /  TBili  1.0  /  DBili  x   /  AST  27  /  ALT  22  /  AlkPhos  91      LIVER FUNCTIONS - ( 2022 05:00 )  Alb: 3.5 g/dL / Pro: 5.8 g/dL / ALK PHOS: 91 U/L / ALT: 22 U/L / AST: 27 U/L / GGT: x           PT/INR - ( 2022 12:34 )   PT: 11.40 sec;   INR: 0.99 ratio         PTT - ( 2022 12:34 )  PTT:35.9 sec  Troponin T, Serum: 0.34 ng/mL ( @ 18:14)    CARDIAC MARKERS ( 2022 18:14 )  x     / 0.34 ng/mL / x     / x     / x      CARDIAC MARKERS ( 2022 05:00 )  x     / 0.32 ng/mL / x     / x     / x      CARDIAC MARKERS ( 2022 12:34 )  x     / 0.14 ng/mL / x     / x     / x              TELEMETRY:     EKG:     IMAGING:         PATIENT:  JOHN LOMBARDI  [id number]    CHIEF COMPLAINT:  Patient is a 61y old  Male who presents with a chief complaint of Chest Pain (2022 10:26)      INTERVAL HISTORY/OVERNIGHT EVENTS:      REVIEW OF SYSTEMS:    Constitutional:     [ ] negative [ ] fevers [ ] chills [ ] weight loss [ ] weight gain  HEENT:                  [ ] negative [ ] dry eyes [ ] eye irritation [ ] postnasal drip [ ] nasal congestion  CV:                         [x ] negative  [ ] chest pain [ ] orthopnea [ ] palpitations [ ] murmur  Resp:                     [x ] negative [ ] cough [ ] shortness of breath [ ] dyspnea [ ] wheezing [ ] sputum [ ] hemoptysis  GI:                          [ ] negative [ ] nausea [ ] vomiting [ ] diarrhea [ ] constipation [ ] abd pain [ ] dysphagia   :                        [ ] negative [ ] dysuria [ ] nocturia [ ] hematuria [ ] increased urinary frequency  Musculoskeletal: [ ] negative [ ] back pain [ ] myalgias [ ] arthralgias [ ] fracture  Skin:                       [ ] negative [ ] rash [ ] itch  Neurological:        [ ] negative [ ] headache [ ] dizziness [ ] syncope [ ] weakness [ ] numbness  Psychiatric:           [ ] negative [ ] anxiety [ ] depression  Endocrine:            [ ] negative [ ] diabetes [ ] thyroid problem  Heme/Lymph:      [ ] negative [ ] anemia [ ] bleeding problem  Allergic/Immune: [ ] negative [ ] itchy eyes [ ] nasal discharge [ ] hives [ ] angioedema    [x ] All other systems negative  [ ] Unable to assess ROS because ________.    MEDICATIONS:  MEDICATIONS  (STANDING):  aspirin  chewable 81 milliGRAM(s) Oral daily  atorvastatin 80 milliGRAM(s) Oral at bedtime  chlorhexidine 4% Liquid 1 Application(s) Topical <User Schedule>  influenza   Vaccine 0.5 milliLiter(s) IntraMuscular once  isosorbide   mononitrate ER Tablet (IMDUR) 30 milliGRAM(s) Oral daily  metoprolol tartrate 50 milliGRAM(s) Oral two times a day  pantoprazole    Tablet 40 milliGRAM(s) Oral before breakfast  ticagrelor 90 milliGRAM(s) Oral every 12 hours    MEDICATIONS  (PRN):      ALLERGIES:  Allergies    No Known Drug Allergies  shellfish (Anaphylaxis)    Intolerances        OBJECTIVE:  ICU Vital Signs Last 24 Hrs  T(C): 36.7 (2022 04:00), Max: 36.9 (2022 16:00)  T(F): 98 (2022 04:00), Max: 98.4 (2022 16:00)  HR: 75 (2022 06:00) (75 - 102)  BP: 97/61 (2022 06:00) (94/59 - 141/85)  BP(mean): 74 (2022 06:00) (70 - 108)  ABP: --  ABP(mean): --  RR: 21 (2022 06:00) (15 - 28)  SpO2: 92% (2022 06:00) (92% - 98%)      Adult Advanced Hemodynamics Last 24 Hrs  CVP(mm Hg): --  CVP(cm H2O): --  CO: --  CI: --  PA: --  PA(mean): --  PCWP: --  SVR: --  SVRI: --  PVR: --  PVRI: --  CAPILLARY BLOOD GLUCOSE      POCT Blood Glucose.: 141 mg/dL (2022 22:15)  POCT Blood Glucose.: 127 mg/dL (2022 16:58)  POCT Blood Glucose.: 144 mg/dL (2022 12:20)    CAPILLARY BLOOD GLUCOSE      POCT Blood Glucose.: 141 mg/dL (2022 22:15)    I&O's Summary    2022 07:01  -  2022 07:00  --------------------------------------------------------  IN: 1060 mL / OUT: 1800 mL / NET: -740 mL      Daily Height in cm: 175.26 (2022 06:00)    Daily Weight in k.1 (2022 06:00)    PHYSICAL EXAMINATION:  Constitutional: pt is comfortablesitting in bed; no acute distress; well-groomed  HEENT: Full ROM in bilateral eyes; pupils symmetrical and equal in size  Respiratory: (+) sounds in all lung fields; no crackles or wheezes appreciated  Cardiovascular: S1 S2 regular rate and rhythm; no murmurs or gallops appreciated  Gastrointestinal: (+) bowel sounds in all quadrants; abdomen is non-distended, non-tender to superficial and deep palpation  Extremities: extremities are non-edematous  Vascular: Warm and Well perfused UE and LE; no mottling or dusky skin; R Radial access site clean, no purulence   Neurological: AxO x3 to self, place and year  Skin: no raches or ulcerations noted; no scaling present    Incisions: None  Tubes: None    LABS:                          12.9   8.70  )-----------( 196      ( 2022 05:00 )             37.6         131<L>  |  103  |  18  ----------------------------<  137<H>  4.4   |  18  |  0.9    Ca    8.6      2022 05:00  Mg     1.8         TPro  5.8<L>  /  Alb  3.5  /  TBili  1.0  /  DBili  x   /  AST  27  /  ALT  22  /  AlkPhos  91      LIVER FUNCTIONS - ( 2022 05:00 )  Alb: 3.5 g/dL / Pro: 5.8 g/dL / ALK PHOS: 91 U/L / ALT: 22 U/L / AST: 27 U/L / GGT: x           PT/INR - ( 2022 12:34 )   PT: 11.40 sec;   INR: 0.99 ratio         PTT - ( 2022 12:34 )  PTT:35.9 sec  Troponin T, Serum: 0.34 ng/mL ( @ 18:14)    CARDIAC MARKERS ( 2022 18:14 )  x     / 0.34 ng/mL / x     / x     / x      CARDIAC MARKERS ( 2022 05:00 )  x     / 0.32 ng/mL / x     / x     / x      CARDIAC MARKERS ( 2022 12:34 )  x     / 0.14 ng/mL / x     / x     / x              TELEMETRY: 6 beats of nonsustained v-tachy overnight     EKG: < from: 12 Lead ECG (22 @ 05:06) >  Diagnosis Line Normal sinus rhythm  Normal ECG    Confirmed by TAWANNA PONCE, NAM ([id number]) on 2022 6:18:30 AM    < end of copied text >

## 2022-01-28 NOTE — DISCHARGE NOTE PROVIDER - NSDCCPCAREPLAN_GEN_ALL_CORE_FT
PRINCIPAL DISCHARGE DIAGNOSIS  Diagnosis: NSTEMI (non-ST elevation myocardial infarction)  Assessment and Plan of Treatment: You came to the hospital with chest pain. Your EKG showed that you had a possible myocardial infarction (heart attack). You underwent a cardiac catheterization which showed that you had blocked blood vessels in your heart. Two stents were placed in your blood vessels. You will need to followup with your cardiologist for another stent placement.   Please take ALL medications as prescribed, and do not miss any doses of your Aspirin or Brilinta. This can cause your stent to close, and may cause another heart attack.   Followup with your primary care physician.      SECONDARY DISCHARGE DIAGNOSES  Diagnosis: Diabetes mellitus  Assessment and Plan of Treatment:      PRINCIPAL DISCHARGE DIAGNOSIS  Diagnosis: NSTEMI (non-ST elevation myocardial infarction)  Assessment and Plan of Treatment: You came to the hospital with chest pain. Your EKG showed that you had a possible myocardial infarction (heart attack). You underwent a cardiac catheterization which showed that you had blocked blood vessels in your heart. Two stents were placed in your blood vessels. You will need to followup with your cardiologist for another stent placement.   Please take ALL medications as prescribed, and do not miss any doses of your Aspirin or Brilinta. This can cause your stent to close, and may cause another heart attack.   Followup with your primary care physician.      SECONDARY DISCHARGE DIAGNOSES  Diagnosis: Diabetes mellitus  Assessment and Plan of Treatment: On your labwork, we found that your Hemoglobin A1C was 7.2, which is very high. This indicates that you have diabetes.   You have Diabetes, which is an condition where your body cannot process sugar properly. When you came to the hospital, it was found that your blood sugar was very high. This can be very dangerous to your kidneys, eyes, and other organs. In the hospital, we controlled your blood sugar with insulin. Upon discharge, it is very important that you follow up with your primary care physician to make sure that your diabetes is adequately controlled. Be sure to eat a very limited amount of sweets, and limit your intake of carbohydrates such as bread, pasta, and potatoes.   Please take all medications as prescribed in these instructions. Be sure to followup with your primary care physician after discharge.

## 2022-01-28 NOTE — DISCHARGE NOTE NURSING/CASE MANAGEMENT/SOCIAL WORK - PATIENT PORTAL LINK FT
You can access the FollowMyHealth Patient Portal offered by [facility] by registering at the following website: [email or web address]. By joining [employer]’s FollowMyHealth portal, you will also be able to view your health information using other applications (apps) compatible with our system.

## 2022-01-28 NOTE — PROGRESS NOTE ADULT - ATTENDING COMMENTS
pt seen and examined  s/p pci of rca with osmel for ami  residual severe lad ds  new onset dm  up titrate antiischemic meds  cont dapt, high dose statin  ensure brilinta coverage  repeat labs  anticipate d/c in am if no event overnight
Patient seen, case d/w CCU team on rounds.  NSTEMI, s/p PCI of RCA.  Residual LAD disease, will need staged PCI.  Imdur and Metoprolol started, tolerating well.  LVEF 51%.  No symptoms overnight, will d/c home today.  F/u with Dr. Prado.
Patient seen, case d/w CCU team on rounds.  61-yo male who presented with NSTEMI, was found to have 2-vessel CAD.   S/p PCI of acute lesion in RCA.  Stable severe plaque in LAD.  No angina, normal LVEF with mild IW hypokinesis (reviewed personally).  Will increase Metoprolol to 50 mg bid and add Imdur.  Continue CCU monitoring overnight.  Will ambulate in the morning and d/c home if remains asymptomatic.  PCI of LAD will be scheduled as outpatient.
pt seen and examined  no active cv complains  short run of nsvt  change bb to xl form  cont all meds  d/c planing  outpt card f/up 1-2 weeks

## 2022-01-28 NOTE — PROGRESS NOTE ADULT - ASSESSMENT
Patient is a 62yo male with PMH of arthritis, hx of diverticulitis s/p partial colon resection, presents for chest pain of 2 days.   In the ED: Vitals: Afebrile, /82, , RR 17, 97% RA, CBC wnl, CMP with mild alk phos elevation 126, Mg 1.8, repleted, Trop elevated 0.08. EKG with new T wave inversions Lead III, AVF, V6, new compared to EKG in 2016. . CHest Xray no cardiomegaly, clear.  (26 Jan 2022 10:30)  Underwent cardiac cath on 1/26 with two stents to RCA.   Planned for staged PCI outpatient.     # NSTEMI s//p RCA PCI  # Staged PCI to LAD planned  # DLD  # Newly Diagnosed DM II     CNS: AxO x3     HEENT:   - routine oral care    Pulmonary:   - no O2 requirements, on room air     Cardiology:   - s/p two stents to RCA  - required Integrilin, Ticagrelor, ASA after procedure --> Integrilin d/c 1/27 7am   - s/p nitro drip for pain  - increase Metoprolol to 50mg BID  - Cont Imdur 30mg daily  - can be discharged for outpatient staged PCI LAD   - Echo shows EF 50% with hypokanesis inferlateral  - c/w Atorvastatin 80mg daily    GI:  - monitor BMs  - c/w Protonix 40mg qd     :  - no changes     ENDOCRINE:   - newly diagnosed DMII, A1C 7.2%   - Follow up with PCP outpatient  - POCT qhs and ISS/basal as needed     HEME:   - c/w ticagrelor and ASA     D/C home today     Patient is a 62yo male with PMH of arthritis, hx of diverticulitis s/p partial colon resection, presents for chest pain of 2 days.   In the ED: Vitals: Afebrile, /82, , RR 17, 97% RA, CBC wnl, CMP with mild alk phos elevation 126, Mg 1.8, repleted, Trop elevated 0.08. EKG with new T wave inversions Lead III, AVF, V6, new compared to EKG in 2016. . CHest Xray no cardiomegaly, clear.  (26 Jan 2022 10:30)  Underwent cardiac cath on 1/26 with two stents to RCA.   Planned for staged PCI outpatient.     # NSTEMI s//p RCA PCI  # Staged PCI to LAD planned  # DLD  # Newly Diagnosed DM II     CNS: AxO x3     HEENT:   - routine oral care    Pulmonary:   - no O2 requirements, on room air     Cardiology:   - s/p two stents to RCA  - required Integrilin, Ticagrelor, ASA after procedure --> Integrilin d/c 1/27 7am   - s/p nitro drip for pain  - increase Metoprolol to 50 mg BID, BP and HR stable overnight   - c/w Imdur 30mg daily   - No bleeding on Ticagrelor + ASA for 24 hours  - f/u echo post cath pending read, but images assessed by cardio attending   - c/w Atorvastatin 80mg daily (Lipid panel showed LDL >130, HDL <40)  - cleared for discharge     GI:  - monitor BMs  - c/w Protonix 40mg qd while inpatient   - not indicated on discharge     :  - no changes     ENDOCRINE:   - newly diagnosed DMII, A1C 7.2%   - may need additional medications on discharge   - POCT qhs and ISS/basal as needed   - instructions for outpatient followup included in d/c packet     HEME:   - c/w ticagrelor and ASA    CCU monitoring  cleared for discharge 1/28

## 2022-01-28 NOTE — DISCHARGE NOTE PROVIDER - HOSPITAL COURSE
Patient is a 62yo male with PMH of arthritis, hx of diverticulitis s/p partial colon resection, presents for chest pain of 2 days.   In the ED: Vitals: Afebrile, /82, , RR 17, 97% RA, CBC wnl, CMP with mild alk phos elevation 126, Mg 1.8, repleted, Trop elevated 0.08. EKG with new T wave inversions Lead III, AVF, V6, new compared to EKG in 2016. . CHest Xray no cardiomegaly, clear.  (26 Jan 2022 10:30)  Underwent cardiac cath on 1/26 with two stents to RCA.   Planned for staged PCI outpatient.     Cleared for discharge on 1/28.

## 2022-01-28 NOTE — DISCHARGE NOTE PROVIDER - NSDCMRMEDTOKEN_GEN_ALL_CORE_FT
ticagrelor 90 mg oral tablet: 1 tab(s) orally 2 times a day MDD:2   aspirin 81 mg oral tablet, chewable: 1 tab(s) orally once a day  atorvastatin 80 mg oral tablet: 1 tab(s) orally once a day (at bedtime)  isosorbide mononitrate 30 mg oral tablet, extended release: 1 tab(s) orally once a day  metoprolol tartrate 50 mg oral tablet: 1 tab(s) orally 2 times a day  ticagrelor 90 mg oral tablet: 1 tab(s) orally 2 times a day MDD:2

## 2022-01-28 NOTE — PROGRESS NOTE ADULT - SUBJECTIVE AND OBJECTIVE BOX
Cardiology Follow up s/p PCI RCA    LOMBARDI, JOHN   61y Male  PAST MEDICAL & SURGICAL HISTORY:  Arthritis    History of colon resection         HPI:  Patient is a 62yo male with PMH of arthritis, hx of diverticulitis s/p partial colon resection, presents for chest pain of 2 days. Chest pain started yesterday while working . Central, pressure-like pain, non-radiating, alleviated yesterday with rest. Reports chronic numbness in both arms due to arhtritis. This am, patient had chest pain again on his way to the bus stop (4 blocks from home). Worse then yesterday. He went to work, pain did not resolve, was brought back home by his boss, alleviated after 2 hrs. Was brought in to the hospital by his wife. Chest pain resolved by the time he presented to the ED. Currently, he denies chest pain, palpitations, dizziness, fevers, chills, N/V/D, abdominal pain, Le edema. He does not follow a cardiologist. Denies hx of HTN, HLD or previous MI. Does not take any medications currently. Reports he had a cardiac cath done 30 years ago, at that time, he was using cocaine and had presented to the hospital for chest pain as well. Patient reports that the cath report was normal. Currently he denies tobacco use, cocaine use. Uses marijuana daily. Hx of tobacco use, stopped 10 years ago, 2p/day for 35 years. Social alcohol drinker. Family history of MI in father at age 40,  at 50s from MI. Mother with PPM, AICD,  at age 91 from cancer. Of note patient reports having COVID one month ago.     In the ED: Vitals: Afebrile, /82, , RR 17, 97% RA, CBC wnl, CMP with mild alk phos elevation 126, Mg 1.8, repleted, Trop elevated 0.08. EKG with new T wave inversions Lead III, AVF, V6, new compared to EKG in 2016. . CHest Xray no cardiomegaly, clear.  (2022 10:30)    Allergies    No Known Drug Allergies  shellfish (Anaphylaxis)    Intolerances      Patient without complaints. Pt ambulated without issues/symptoms  Denies CP, SOB, palpitations, or dizziness  NSVT on telemetry overnight    Vital Signs Last 24 Hrs  T(C): 36.7 (2022 04:00), Max: 36.9 (2022 16:00)  T(F): 98 (2022 04:00), Max: 98.4 (2022 16:00)  HR: 75 (2022 06:00) (75 - 98)  BP: 97/61 (2022 06:00) (94/59 - 141/85)  BP(mean): 74 (2022 06:00) (70 - 108)  RR: 21 (2022 06:00) (15 - 28)  SpO2: 92% (2022 06:00) (92% - 96%)    MEDICATIONS  (STANDING):  aspirin  chewable 81 milliGRAM(s) Oral daily  atorvastatin 80 milliGRAM(s) Oral at bedtime  chlorhexidine 4% Liquid 1 Application(s) Topical <User Schedule>  influenza   Vaccine 0.5 milliLiter(s) IntraMuscular once  isosorbide   mononitrate ER Tablet (IMDUR) 30 milliGRAM(s) Oral daily  metoprolol tartrate 50 milliGRAM(s) Oral two times a day  pantoprazole    Tablet 40 milliGRAM(s) Oral before breakfast  ticagrelor 90 milliGRAM(s) Oral every 12 hours    MEDICATIONS  (PRN):      REVIEW OF SYSTEMS:          CONSTITUTIONAL: No weakness, fevers or chills          EYES/ENT: No visual changes;  No vertigo or throat pain           NECK: No pain or stiffness          RESPIRATORY: No cough, wheezing, hemoptysis          CARDIOVASCULAR: no pain, no MATT, no palpitations           GASTROINTESTINAL: No abdominal or epigastric pain. No nausea, vomiting, or hematemesis;           GENITOURINARY: No dysuria, frequency or hematuria          NEUROLOGICAL: No numbness or weakness          SKIN: No itching, rashes    PHYSICAL EXAM:           CONSTITUTIONAL: Well-developed; well-nourished; in no acute distress  	SKIN: warm, dry  	HEAD: Normocephalic; atraumatic  	EYES: PERRL.  	ENT: No nasal discharge, airway clear, mucous membranes moist  	NECK: Supple; non tender.  	CARD: +S1, +S2, no murmurs, gallops, or rubs. (Regular) rate and rhythm    	RESP: No wheezes, rales or rhonchi. CTA B/L  	ABD: soft ntnd, + BS x 4 quadrants  	EXT: moves all extremities,  no clubbing, cyanosis or edema  	NEURO: Alert and oriented x3, no focal deficits          PSYCH: Cooperative, appropriate          VASCULAR:  + Rad / + PTs / + DPs          EXTREMITY:  	   Right Radial: Dressing removed, access site soft, + pulses, no hematoma, no pain, no numbness, no signs and symptoms of infection             ECG:   Ventricular Rate 82 BPM    Atrial Rate 82 BPM    P-R Interval 142 ms    QRS Duration 80 ms    Q-T Interval 392 ms    QTC Calculation(Bazett) 457 ms    P Axis 45 degrees    R Axis 23 degrees    T Axis 8 degrees    Diagnosis Line Normal sinus rhythm  Normal ECG    Confirmed by NAM STACY MD ([id number]) on 2022 6:18:30 AM                                                                                                                  2D ECHO: performed, report P    LABS:                        12.9   8.70  )-----------( 196      ( 2022 05:00 )             37.6         131<L>  |  103  |  18  ----------------------------<  137<H>  4.4   |  18  |  0.9    Ca    8.6      2022 05:00  Mg     1.8         TPro  5.8<L>  /  Alb  3.5  /  TBili  1.0  /  DBili  x   /  AST  27  /  ALT  22  /  AlkPhos  91      CARDIAC MARKERS ( 2022 18:14 )  x     / 0.34 ng/mL / x     / x     / x      CARDIAC MARKERS ( 2022 05:00 )  x     / 0.32 ng/mL / x     / x     / x      CARDIAC MARKERS ( 2022 12:34 )  x     / 0.14 ng/mL / x     / x     / x          Magnesium, Serum: 1.8 mg/dL [1.8 - 2.4] (22 @ 05:00)  Magnesium, Serum: 1.9 mg/dL [1.8 - 2.4] (22 @ 18:14)  LIVER FUNCTIONS - ( 2022 05:00 )  Alb: 3.5 g/dL / Pro: 5.8 g/dL / ALK PHOS: 91 U/L / ALT: 22 U/L / AST: 27 U/L / GGT: x             A/P:  I discussed the case with Cardiologist Dr. Prado and recommend the following:    S/P PCI :    POST-OP DIAGNOSIS:    significant triple vessel disease, syntax 16    Intervention: PCI ANALIA x 2 Resolute to mRCA and dRCA stenosis    	     Continue DAPT ( Aspirin 81 mg PO Daily and Brilinta 90mg po twice a day ),  B-Blocker, Statin Therapy, PPI                   No ACE indicated d/t episodes of borderline BP/EF 51%, reevaluate as outpt                   Patient pharmacy called in for Brilinta  prescription and it is $5 per month, patient aware and agreeable, Rx available for pickup                   GDMT for additional lesions at this time                   OOB ambulate                   DM management as per CCU team, elevated A1C                   Monitor access site                   Patient agreeing to take DAPT for at least one year or as directed by cardiologist                    Pt given instructions on importance of taking antiplatelet medication or risk acute stent thrombosis/death                   Post cath instructions, access site care and activity restrictions reviewed with patient                     Discussed with patient to return to hospital if experience chest pain, shortness breath, dizziness and site bleeding                   Aggressive risk factor modification, diet counseling, smoking cessation discussed with patient                    Cardiac rehab info provided/referral and communication to cardiac rehab provided                   Staged PCI to LAD as outpatient in 4-6 weeks

## 2022-01-28 NOTE — DISCHARGE NOTE NURSING/CASE MANAGEMENT/SOCIAL WORK - NSDCPEFALRISK_GEN_ALL_CORE
For information on Fall & Injury Prevention, visit: [email or web address] OR  [email or web address] OR  https://www.cdc.gov/steadi/patient.html

## 2022-01-28 NOTE — DISCHARGE NOTE PROVIDER - CARE PROVIDER_API CALL
Tru Prado)  Cardiovascular Disease; Internal Medicine; Interventional Cardiology; Nuclear Cardiology  [address] [place] [address], [facility] [address]  [place] [address]  Phone: [phone]  Fax: [phone]  Follow Up Time: 2 weeks    NUPUR OSUNA  Internal Medicine  Heike CRONIN,    Phone: ()-  Fax: ()-  Follow Up Time: 2 weeks

## 2022-01-29 NOTE — PROGRESS NOTE ADULT - SUBJECTIVE AND OBJECTIVE BOX
PATIENT:  JOHN LOMBARDI  [id number]    CHIEF COMPLAINT:  Patient is a 61y old  Male who presents with a chief complaint of Chest Pain (28 Jan 2022 10:51)      INTERVAL HISTORY/OVERNIGHT EVENTS:      REVIEW OF SYSTEMS:    Constitutional:     [ ] negative [ ] fevers [ ] chills [ ] weight loss [ ] weight gain  HEENT:                  [ ] negative [ ] dry eyes [ ] eye irritation [ ] postnasal drip [ ] nasal congestion  CV:                         [ ] negative  [ ] chest pain [ ] orthopnea [ ] palpitations [ ] murmur  Resp:                     [ ] negative [ ] cough [ ] shortness of breath [ ] dyspnea [ ] wheezing [ ] sputum [ ] hemoptysis  GI:                          [ ] negative [ ] nausea [ ] vomiting [ ] diarrhea [ ] constipation [ ] abd pain [ ] dysphagia   :                        [ ] negative [ ] dysuria [ ] nocturia [ ] hematuria [ ] increased urinary frequency  Musculoskeletal: [ ] negative [ ] back pain [ ] myalgias [ ] arthralgias [ ] fracture  Skin:                       [ ] negative [ ] rash [ ] itch  Neurological:        [ ] negative [ ] headache [ ] dizziness [ ] syncope [ ] weakness [ ] numbness  Psychiatric:           [ ] negative [ ] anxiety [ ] depression  Endocrine:            [ ] negative [ ] diabetes [ ] thyroid problem  Heme/Lymph:      [ ] negative [ ] anemia [ ] bleeding problem  Allergic/Immune: [ ] negative [ ] itchy eyes [ ] nasal discharge [ ] hives [ ] angioedema    [ ] All other systems negative  [ ] Unable to assess ROS because ________.    MEDICATIONS:  MEDICATIONS  (STANDING):    MEDICATIONS  (PRN):      ALLERGIES:  Allergies    No Known Drug Allergies  shellfish (Anaphylaxis)    Intolerances        OBJECTIVE:  ICU Vital Signs Last 24 Hrs  T(C): 36.7 (28 Jan 2022 08:00), Max: 36.7 (28 Jan 2022 08:00)  T(F): 98.1 (28 Jan 2022 08:00), Max: 98.1 (28 Jan 2022 08:00)  HR: 85 (28 Jan 2022 14:00) (82 - 85)  BP: 124/84 (28 Jan 2022 14:00) (89/54 - 124/84)  BP(mean): 96 (28 Jan 2022 14:00) (66 - 96)  ABP: --  ABP(mean): --  RR: 24 (28 Jan 2022 14:00) (20 - 24)  SpO2: 95% (28 Jan 2022 14:00) (94% - 95%)      Adult Advanced Hemodynamics Last 24 Hrs  CVP(mm Hg): --  CVP(cm H2O): --  CO: --  CI: --  PA: --  PA(mean): --  PCWP: --  SVR: --  SVRI: --  PVR: --  PVRI: --  CAPILLARY BLOOD GLUCOSE      POCT Blood Glucose.: 134 mg/dL (28 Jan 2022 11:12)    CAPILLARY BLOOD GLUCOSE      POCT Blood Glucose.: 134 mg/dL (28 Jan 2022 11:12)    I&O's Summary    28 Jan 2022 07:01  -  29 Jan 2022 07:00  --------------------------------------------------------  IN: 0 mL / OUT: 1025 mL / NET: -1025 mL      Daily Height in cm: 175.26 (28 Jan 2022 10:51)    Daily     PHYSICAL EXAMINATION:  General: WN/WD NAD  HEENT: PERRLA, EOMI, moist mucous membranes  Neurology: A&Ox3, nonfocal, RICO x 4  Respiratory: CTA B/L, normal respiratory effort, no wheezes, crackles, rales  CV: RRR, S1S2, no murmurs, rubs or gallops  Abdominal: Soft, NT, ND +BS, Last BM  Extremities: No edema, + peripheral pulses  Incisions:   Tubes:    LABS:                          12.9   8.70  )-----------( 196      ( 28 Jan 2022 05:00 )             37.6     01-28    131<L>  |  103  |  18  ----------------------------<  137<H>  4.4   |  18  |  0.9    Ca    8.6      28 Jan 2022 05:00  Mg     1.8     01-28    TPro  5.8<L>  /  Alb  3.5  /  TBili  1.0  /  DBili  x   /  AST  27  /  ALT  22  /  AlkPhos  91  01-28    LIVER FUNCTIONS - ( 28 Jan 2022 05:00 )  Alb: 3.5 g/dL / Pro: 5.8 g/dL / ALK PHOS: 91 U/L / ALT: 22 U/L / AST: 27 U/L / GGT: x               CARDIAC MARKERS ( 27 Jan 2022 18:14 )  x     / 0.34 ng/mL / x     / x     / x              TELEMETRY:     EKG:     IMAGING:

## 2022-02-02 DIAGNOSIS — Z90.49 ACQUIRED ABSENCE OF OTHER SPECIFIED PARTS OF DIGESTIVE TRACT: ICD-10-CM

## 2022-02-02 DIAGNOSIS — M19.90 UNSPECIFIED OSTEOARTHRITIS, UNSPECIFIED SITE: ICD-10-CM

## 2022-02-02 DIAGNOSIS — Z82.49 FAMILY HISTORY OF ISCHEMIC HEART DISEASE AND OTHER DISEASES OF THE CIRCULATORY SYSTEM: ICD-10-CM

## 2022-02-02 DIAGNOSIS — Z86.16 PERSONAL HISTORY OF COVID-19: ICD-10-CM

## 2022-02-02 DIAGNOSIS — R07.9 CHEST PAIN, UNSPECIFIED: ICD-10-CM

## 2022-02-02 DIAGNOSIS — Z91.013 ALLERGY TO SEAFOOD: ICD-10-CM

## 2022-02-02 DIAGNOSIS — Z87.891 PERSONAL HISTORY OF NICOTINE DEPENDENCE: ICD-10-CM

## 2022-02-02 DIAGNOSIS — E11.9 TYPE 2 DIABETES MELLITUS WITHOUT COMPLICATIONS: ICD-10-CM

## 2022-02-02 DIAGNOSIS — I25.10 ATHEROSCLEROTIC HEART DISEASE OF NATIVE CORONARY ARTERY WITHOUT ANGINA PECTORIS: ICD-10-CM

## 2022-02-02 DIAGNOSIS — F12.90 CANNABIS USE, UNSPECIFIED, UNCOMPLICATED: ICD-10-CM

## 2022-02-02 DIAGNOSIS — I21.4 NON-ST ELEVATION (NSTEMI) MYOCARDIAL INFARCTION: ICD-10-CM

## 2022-02-04 ENCOUNTER — APPOINTMENT (OUTPATIENT)
Dept: CARDIOLOGY | Facility: CLINIC | Age: 62
End: 2022-02-04
Payer: COMMERCIAL

## 2022-02-04 VITALS
HEART RATE: 79 BPM | DIASTOLIC BLOOD PRESSURE: 80 MMHG | HEIGHT: 66 IN | SYSTOLIC BLOOD PRESSURE: 110 MMHG | BODY MASS INDEX: 27.48 KG/M2 | TEMPERATURE: 97.4 F | WEIGHT: 171 LBS

## 2022-02-04 DIAGNOSIS — Z87.898 PERSONAL HISTORY OF OTHER SPECIFIED CONDITIONS: ICD-10-CM

## 2022-02-04 DIAGNOSIS — Z82.49 FAMILY HISTORY OF ISCHEMIC HEART DISEASE AND OTHER DISEASES OF THE CIRCULATORY SYSTEM: ICD-10-CM

## 2022-02-04 DIAGNOSIS — Z87.891 PERSONAL HISTORY OF NICOTINE DEPENDENCE: ICD-10-CM

## 2022-02-04 DIAGNOSIS — Z83.3 FAMILY HISTORY OF DIABETES MELLITUS: ICD-10-CM

## 2022-02-04 DIAGNOSIS — F12.90 CANNABIS USE, UNSPECIFIED, UNCOMPLICATED: ICD-10-CM

## 2022-02-04 PROCEDURE — 93000 ELECTROCARDIOGRAM COMPLETE: CPT

## 2022-02-04 PROCEDURE — 99214 OFFICE O/P EST MOD 30 MIN: CPT

## 2022-02-04 RX ORDER — ASPIRIN 81 MG
81 TABLET, DELAYED RELEASE (ENTERIC COATED) ORAL DAILY
Refills: 0 | Status: DISCONTINUED | COMMUNITY
End: 2022-02-04

## 2022-02-04 RX ORDER — METOPROLOL TARTRATE 50 MG/1
50 TABLET, FILM COATED ORAL
Qty: 180 | Refills: 0 | Status: DISCONTINUED | COMMUNITY
End: 2022-02-04

## 2022-02-08 NOTE — ASSESSMENT
[FreeTextEntry1] : 62 yo male with pmhx and presentation as above\par cad/ami, pci of rca with osmel\par residual severe lad ds\par icm\par htn/dm/dyslipidemia\par cont all meds\par hx records reviewed\par will set up for staged pci of lad in 4-6 weeks\par aggressive risk modif\par diet and act-s as tolerated\par rtc after cath

## 2022-02-08 NOTE — HISTORY OF PRESENT ILLNESS
[FreeTextEntry1] : Patient is a 60yo male with PMH of arthritis, hx of diverticulitis s/p partial\par colon resection\par Father  at 64 from MI\par former smoker quit 8 years ago smoked 1 to  2 packs per day x 40 years. denies tobacco use, hx of  cocaine use. Quit 30 years ago \par marijuana daily. Post recent  COVID in 2020\par Post hospitalization with NSTEMI\par Cardiac cath - severe 2v cad, s/p pci pci of rca\par Currently feels good, no chest pain.  + SOB with moderate exertion. \par \par \par \par

## 2022-02-08 NOTE — REVIEW OF SYSTEMS
[SOB] : shortness of breath [Negative] : Heme/Lymph [Feeling Fatigued] : feeling fatigued [FreeTextEntry5] : with moderate exertion

## 2022-02-08 NOTE — REASON FOR VISIT
[CV Risk Factors and Non-Cardiac Disease] : CV risk factors and non-cardiac disease [Hyperlipidemia] : hyperlipidemia [Hypertension] : hypertension [Coronary Artery Disease] : coronary artery disease

## 2022-02-25 ENCOUNTER — APPOINTMENT (OUTPATIENT)
Dept: CARDIOLOGY | Facility: CLINIC | Age: 62
End: 2022-02-25

## 2022-03-04 ENCOUNTER — LABORATORY RESULT (OUTPATIENT)
Age: 62
End: 2022-03-04

## 2022-03-07 ENCOUNTER — OUTPATIENT (OUTPATIENT)
Dept: OUTPATIENT SERVICES | Facility: HOSPITAL | Age: 62
LOS: 1 days | Discharge: HOME | End: 2022-03-07
Payer: COMMERCIAL

## 2022-03-07 VITALS
SYSTOLIC BLOOD PRESSURE: 127 MMHG | DIASTOLIC BLOOD PRESSURE: 75 MMHG | RESPIRATION RATE: 16 BRPM | OXYGEN SATURATION: 99 % | HEART RATE: 71 BPM

## 2022-03-07 DIAGNOSIS — Z90.49 ACQUIRED ABSENCE OF OTHER SPECIFIED PARTS OF DIGESTIVE TRACT: Chronic | ICD-10-CM

## 2022-03-07 LAB
ANION GAP SERPL CALC-SCNC: 10 MMOL/L — SIGNIFICANT CHANGE UP (ref 7–14)
BUN SERPL-MCNC: 22 MG/DL — HIGH (ref 10–20)
CALCIUM SERPL-MCNC: 9.2 MG/DL — SIGNIFICANT CHANGE UP (ref 8.5–10.1)
CHLORIDE SERPL-SCNC: 105 MMOL/L — SIGNIFICANT CHANGE UP (ref 98–110)
CO2 SERPL-SCNC: 22 MMOL/L — SIGNIFICANT CHANGE UP (ref 17–32)
CREAT SERPL-MCNC: 1 MG/DL — SIGNIFICANT CHANGE UP (ref 0.7–1.5)
EGFR: 86 ML/MIN/1.73M2 — SIGNIFICANT CHANGE UP
GLUCOSE SERPL-MCNC: 186 MG/DL — HIGH (ref 70–99)
HCT VFR BLD CALC: 39.4 % — LOW (ref 42–52)
HCT VFR BLD CALC: 42.7 % — SIGNIFICANT CHANGE UP (ref 42–52)
HGB BLD-MCNC: 14.1 G/DL — SIGNIFICANT CHANGE UP (ref 14–18)
HGB BLD-MCNC: 14.5 G/DL — SIGNIFICANT CHANGE UP (ref 14–18)
MCHC RBC-ENTMCNC: 30.6 PG — SIGNIFICANT CHANGE UP (ref 27–31)
MCHC RBC-ENTMCNC: 31.9 PG — HIGH (ref 27–31)
MCHC RBC-ENTMCNC: 34 G/DL — SIGNIFICANT CHANGE UP (ref 32–37)
MCHC RBC-ENTMCNC: 35.8 G/DL — SIGNIFICANT CHANGE UP (ref 32–37)
MCV RBC AUTO: 89.1 FL — SIGNIFICANT CHANGE UP (ref 80–94)
MCV RBC AUTO: 90.1 FL — SIGNIFICANT CHANGE UP (ref 80–94)
NRBC # BLD: 0 /100 WBCS — SIGNIFICANT CHANGE UP (ref 0–0)
NRBC # BLD: 0 /100 WBCS — SIGNIFICANT CHANGE UP (ref 0–0)
PLATELET # BLD AUTO: 174 K/UL — SIGNIFICANT CHANGE UP (ref 130–400)
PLATELET # BLD AUTO: 188 K/UL — SIGNIFICANT CHANGE UP (ref 130–400)
POTASSIUM SERPL-MCNC: 4.8 MMOL/L — SIGNIFICANT CHANGE UP (ref 3.5–5)
POTASSIUM SERPL-SCNC: 4.8 MMOL/L — SIGNIFICANT CHANGE UP (ref 3.5–5)
RBC # BLD: 4.42 M/UL — LOW (ref 4.7–6.1)
RBC # BLD: 4.74 M/UL — SIGNIFICANT CHANGE UP (ref 4.7–6.1)
RBC # FLD: 12.9 % — SIGNIFICANT CHANGE UP (ref 11.5–14.5)
RBC # FLD: 12.9 % — SIGNIFICANT CHANGE UP (ref 11.5–14.5)
SODIUM SERPL-SCNC: 137 MMOL/L — SIGNIFICANT CHANGE UP (ref 135–146)
WBC # BLD: 6.45 K/UL — SIGNIFICANT CHANGE UP (ref 4.8–10.8)
WBC # BLD: 7.46 K/UL — SIGNIFICANT CHANGE UP (ref 4.8–10.8)
WBC # FLD AUTO: 6.45 K/UL — SIGNIFICANT CHANGE UP (ref 4.8–10.8)
WBC # FLD AUTO: 7.46 K/UL — SIGNIFICANT CHANGE UP (ref 4.8–10.8)

## 2022-03-07 PROCEDURE — 92978 ENDOLUMINL IVUS OCT C 1ST: CPT | Mod: 26,LD

## 2022-03-07 PROCEDURE — 92928 PRQ TCAT PLMT NTRAC ST 1 LES: CPT | Mod: LD

## 2022-03-07 PROCEDURE — 93010 ELECTROCARDIOGRAM REPORT: CPT

## 2022-03-07 PROCEDURE — 93458 L HRT ARTERY/VENTRICLE ANGIO: CPT | Mod: 26,XU

## 2022-03-07 RX ORDER — FAMOTIDINE 10 MG/ML
0 INJECTION INTRAVENOUS
Qty: 0 | Refills: 0 | DISCHARGE

## 2022-03-07 RX ORDER — FAMOTIDINE 10 MG/ML
1 INJECTION INTRAVENOUS
Qty: 0 | Refills: 0 | DISCHARGE

## 2022-03-07 NOTE — ASU PATIENT PROFILE, ADULT - FALL HARM RISK - UNIVERSAL INTERVENTIONS
Bed in lowest position, wheels locked, appropriate side rails in place/Call bell, personal items and telephone in reach/Instruct patient to call for assistance before getting out of bed or chair/Non-slip footwear when patient is out of bed/[place] to call system/Physically safe environment - no spills, clutter or unnecessary equipment/Purposeful Proactive Rounding/Room/bathroom lighting operational, light cord in reach

## 2022-03-07 NOTE — CHART NOTE - NSCHARTNOTEFT_GEN_A_CORE
PRE-OP DIAGNOSIS:     Coronary artery disease. H/o NSTEMI in January 2022 s/p PCI of mid and distal RCA. Staged bifurcation PCI of proximal LAD today.     PROCEDURE: Coronary angiogram, [facility], [place], PCI      Attending:  Dr. Salena Reardon     Interventional Fellow:  Dr. Jara       Consent:      [x] Patient     [] Family Member     []  Used        Anesthesia:     [] General     [x] Sedation     [x] Local        Access & Closure:     [] Fr Radial Artery     [x] 7Fr right Femoral Artery -> perclose     [] Fr Femoral Vein     [] Fr Brachial Vein       IV Contrast:  200   mL        Intervention:       Implants:        FINDINGS:     Coronary Dominance:       LM:     LAD:   D1:    LCX:   OM1:    Ramus:     RCA:   RPDA:  RPL:     LVEDP:        ESTIMATED BLOOD LOSS: < 30 mL        CONDITION:     [] Good     [] Fair     [] Critical        SPECIMEN REMOVED: N/A       POST-OP DIAGNOSIS:           PLAN OF CARE:     IV NS   cc/hr x   Medical therapy and risk factor modification PRE-OP DIAGNOSIS:     Coronary artery disease. H/o NSTEMI in January 2022 s/p PCI of mid and distal RCA. Staged bifurcation PCI of proximal LAD today.     PROCEDURE: Coronary angiogram, [facility], PCI      Attending:  Dr. Salena Reardon     Interventional Fellow:  Dr. Jara       Consent:      [x] Patient     [] Family Member     []  Used        Anesthesia:     [] General     [x] Sedation     [x] Local        Access & Closure:     [] Fr Radial Artery     [x] 7Fr right Femoral Artery -> perclose     [] Fr Femoral Vein     [] Fr Brachial Vein       IV Contrast:  200   mL        Intervention:   PTCA, IVUS, ANALIA x 1 to prox LAD  PTCA to D1    Implants:    4.0 x 30 Cristo stent to prox LAD     FINDINGS:     Hemodynamics: Hemodynamic assessment demonstrates normal hemodynamics and normal LVEDP.     Ventricles: No left ventriculogram was performed.     Valves: Aortic valve: No significant aortic gradient.     Coronary circulation: The coronary circulation is right dominant. There was 2-vessel coronary artery disease (LAD and RI).     Left main: Angiography showed no evidence of disease.     LAD: The vessel was large sized. Proximal LAD: There was a tubular 95 % stenosis at a site with no prior intervention, at the origin of D1. The lesion was eccentric. There was CORRINA grade 2 flow through the vessel (partial perfusion). This lesion is a likely culprit for the patient's anginal symptoms. An intervention was performed. Mid LAD: Angiography showed mild atherosclerosis with no flow limiting lesions. Distal LAD: Angiography showed no evidence of disease. 1st diagonal: The vessel was medium sized. There was a discrete 70 % stenosis at the ostium of the vessel segment. An intervention was performed.     Circumflex: The vessel was medium sized. Angiography showed mild atherosclerosis with no flow limiting lesions. 1st obtuse marginal: Angiography showed no evidence of disease.     Ramus intermedius: The vessel was large sized. There was a discrete 70 % stenosis in the proximal third of the vessel segment.     RCA: The vessel was large sized (dominant). Angiography showed mild atherosclerosis with no flow limiting lesions. Patent stents in mid and distal RCA. Right PDA: Angiography showed no evidence of disease. Right posterolateral segment: Angiography showed mild atherosclerosis with no flow limiting lesions.         ESTIMATED BLOOD LOSS: < 30 mL        CONDITION:     [x] Good     [] Fair     [] Critical        SPECIMEN REMOVED: N/A       POST-OP DIAGNOSIS:    Successful staged bifurcation PCI of proximal LAD/D1 (AUC score 7).        PLAN OF CARE:   d/c home at 5pm  IV NS  150 cc/hr x 7 hrs  Medical therapy including DAPT and risk factor modification  out pt f/u in 1-2 weeks PRE-OP DIAGNOSIS:     Coronary artery disease. H/o NSTEMI in January 2022 s/p PCI of mid and distal RCA. Staged bifurcation PCI of proximal LAD today.     PROCEDURE: Coronary angiogram, [facility], PCI      Attending:  Dr. Salena Reardon     Interventional Fellow:  Dr. Jara       Consent:      [x] Patient     [] Family Member     []  Used        Anesthesia:     [] General     [x] Sedation     [x] Local        Access & Closure:     [] Fr Radial Artery     [x] 7Fr right Femoral Artery -> perclose     [] Fr Femoral Vein     [] Fr Brachial Vein       IV Contrast:  200   mL        Intervention:   PTCA, IVUS, ANALIA x 1 to prox LAD  PTCA to D1    Implants:    4.0 x 30 Cristo stent to prox LAD     FINDINGS:     Hemodynamics: Hemodynamic assessment demonstrates normal hemodynamics and normal LVEDP.     Ventricles: not done    Valves: Aortic valve: No significant aortic gradient.     Coronary circulation: The coronary circulation is right dominant. There was 2-vessel coronary artery disease (LAD and RI).     Left main: Angiography showed no evidence of disease.     LAD: The vessel was large sized. Proximal LAD: There was a tubular 95 % stenosis at a site with no prior intervention, at the origin of D1. The lesion was eccentric. There was CORRINA grade 2 flow through the vessel (partial perfusion). This lesion is a likely culprit for the patient's anginal symptoms. An intervention was performed. Mid LAD: Angiography showed mild atherosclerosis with no flow limiting lesions. Distal LAD: Angiography showed no evidence of disease. 1st diagonal: The vessel was medium sized. There was a discrete 70 % stenosis at the ostium of the vessel segment. An intervention was performed.     Circumflex: The vessel was medium sized. Angiography showed mild atherosclerosis with no flow limiting lesions. 1st obtuse marginal: Angiography showed no evidence of disease.     Ramus intermedius: The vessel was large sized. There was a discrete 70 % stenosis in the proximal third of the vessel segment.     RCA: The vessel was large sized (dominant). Angiography showed mild atherosclerosis with no flow limiting lesions. Patent stents in mid and distal RCA. Right PDA: Angiography showed no evidence of disease. Right posterolateral segment: Angiography showed mild atherosclerosis with no flow limiting lesions.         ESTIMATED BLOOD LOSS: < 30 mL        CONDITION:     [x] Good     [] Fair     [] Critical        SPECIMEN REMOVED: N/A       POST-OP DIAGNOSIS:    Successful staged bifurcation PCI of proximal LAD/D1 (AUC score 7).        PLAN OF CARE:   d/c home at 5pm  IV NS  150 cc/hr x 7 hrs  Medical therapy including DAPT and risk factor modification  out pt f/u in 1-2 weeks

## 2022-03-07 NOTE — H&P CARDIOLOGY - HISTORY OF PRESENT ILLNESS
Patient is a 61y Male who presents to the cardiology department for LHC.       Pre cath note:    indication:  [ ] STEMI                [ ] NSTEMI                 [ ] Acute coronary syndrome                     [ ]Unstable Angina   [ ] high risk  [ ] intermediate risk  [ ] low risk                     [x ] Stable Angina     non-invasive testing:                          Date:                     result: [ ] high risk  [ ] intermediate risk  [ ] low risk    Anti- Anginal medications:                    [ ] not used                       [ x] used                   [ ] not used but strong indication not to use    Ejection Fraction                   [ ] <29            [ ] 30-39%   [ ] 40-49%     [ ]>50%    CHF                   [ ] active (within last 14 days on meds   [ ] Chronic (on meds but no exacerbation)    COPD                   [ ] mild (on chronic bronchodilators)  [ ] moderate (on chronic steroid therapy)      [ ] severe (indication for home O2 or PACO2 >50)    Other risk factors:                       [x ] Previous MI                     [ ] CVA/ stroke                    [ ] carotid stent/ CEA                    [ ] PVD/PAD- (arterial aneurysm, non-palpable pulses, tortuous vessel with inability to insert catheter, infra-renal dissection, renal or subclavian artery stenosis)                    [ ] diabetic                    [ ] previous CABG                    [ ] Renal Failure       Adjusted Bleeding Score: 0.7%    SUBJ:  60 y/o male presents for Staged LHC. Pt c/o continued dyspnea and chest heaviness.    Intervention: PCI ANALIA x 2 Resolute to mRCA and dRCA stenosis 1/26/22  AUC score: 8  FINDINGS:   Coronary Dominance: right  LM: no disease   LAD: proximal mild disease; mid discrete 95% stenosis at D1 origin, s/p unsuccessful crossing of this lesion; distal no disease  Diag: mild ostial disease  CX: luminal irregularities   OM: no disease  Ramus: large sized, 70% tubular stenosis in mid-segment  RCA: proximal mild disease; mid 90% thrombotic tubular stenosis; distal 95% discrete stenosis; s/p successful PCI  RPDA: no disease  LVEDP: normal    POST-OP DIAGNOSIS:    [x] significant triple vessel disease, syntax 16          REVIEW OF SYSTEMS:  CONSTITUTIONAL: No fever, weight loss, or fatigue  CARDIOLOGY: Patient denies chest pain, shortness of breath or syncopal episodes.   RESPIRATORY: denies shortness of breath, wheezing.   NEUROLOGICAL: NO weakness, no focal deficits to report.  GI: no BRBPR, no N,V,diarrhea.     PHYSICAL EXAM:  · CONSTITUTIONAL:	Well-developed, well nourished     · RESPIRATORY:   airway patent; breath sounds equal; good air movement; respirations non-labored; clear to auscultation bilaterally; no chest wall tenderness; no intercostal retractions; no rales,rhonchi or wheeze  · CARDIOVASCULAR	regular rate and rhythm  no rub  no murmur    · EXTREMITIES: No cyanosis, clubbing or edema  · VASCULAR: 	Equal and normal pulses (carotid, femoral, dorsalis pedis)  	  Guillermo Test WNL

## 2022-03-07 NOTE — PROGRESS NOTE ADULT - SUBJECTIVE AND OBJECTIVE BOX
Cardiology Follow up    LOMBARDI, JOHN   61y Male  PAST MEDICAL & SURGICAL HISTORY:  Arthritis    History of colon resection         HPI:  Patient is a 61y Male who presents to the cardiology department for LHC.     SUBJ:  62 y/o male presents for Staged LHC. Pt c/o continued dyspnea and chest heaviness.    Intervention: PCI ANALIA x 2 Resolute to mRCA and dRCA stenosis 1/26/22      Allergies    No Known Drug Allergies  shellfish (Anaphylaxis)    Intolerances      Patient without complaints. Pt ambulated without issues/symptoms  Denies CP, SOB, palpitations, or dizziness  No events on telemetry    Vital Signs Last 24 Hrs    HR: 62  BP: 102/ 70  RR: 16  SpO2: 96%           REVIEW OF SYSTEMS:          All negative except as mentioned in HPI    PHYSICAL EXAM:           CONSTITUTIONAL: Well-developed; well-nourished; in no acute distress  	SKIN: warm, dry  	HEAD: Normocephalic; atraumatic  	EYES: PERRL.  	ENT: No nasal discharge, airway clear, mucous membranes moist  	NECK: Supple; non tender.  	CARD: +S1, +S2, no murmurs, gallops, or rubs. Regular rate and rhythm    	RESP: No wheezes, rales or rhonchi. CTA B/L  	ABD: soft ntnd, + BS x 4 quadrants  	EXT: moves all extremities,  no clubbing, cyanosis or edema  	NEURO: Alert and oriented x3, no focal deficits          PSYCH: Cooperative, appropriate          VASCULAR:  +2 Rad / +2 PTs / + 2 DPs          EXTREMITY:             Right Groin:  Dressing D/C/I, PERCLOSE,  access site soft, no hematoma, no pain, + pulses, no sign of infection, no numbness  	              ECG:   EKG PENDING 1430   CBC PENDING 1430                                                                                                                  LABS:                        14.5   6.45  )-----------( 188      ( 07 Mar 2022 07:35 )             42.7     03-07    137  |  105  |  22<H>  ----------------------------<  186<H>  4.8   |  22  |  1.0    Ca    9.2      07 Mar 2022 07:35              A/P:  I discussed the case with Cardiologist Dr. Prado and recommend the following:    S/P PCI:      ANALIA x 1 pLAD  Kissing Balloon Diag       	         Continue DAPT ( Aspirin 81 mg PO Daily and Brilinta 90mg po twice a day ),  B-Blocker, Statin Therapy                   Patient pharmacy called in for Brilinta per month, patient aware and agreeable, Rx available for pickup                   CBC @ 1430                    EKG @ 1430                   NS @150 ml/hr x 7hrs                   Monitor access site                   Patient agreeing to take DAPT for at least one year or as directed by cardiologist                    Pt given instructions on importance of taking antiplatelet medication or risk acute stent thrombosis/death                   Post cath instructions, access site care and activity restrictions reviewed with patient                     Discussed with patient to return to hospital if experience chest pain, shortness breath, dizziness and site bleeding                   Aggressive risk factor modification, diet counseling, smoking cessation discussed with patient                       Cardiac Rehab info provided/ referral and communication to cardiac rehab provided.                    Can discharge patient @1730 from cardiac standpoint after ambulating without symptoms and access site wnl and ECG reviewed                    Follow up with Cardiology Dr. Prado  in one to two weeks.  Instructed to call and make an appointment                                       Cardiology Follow up    LOMBARDI, JOHN   61y Male  PAST MEDICAL & SURGICAL HISTORY:  Arthritis    History of colon resection         HPI:  Patient is a 61y Male who presents to the cardiology department for LHC.     SUBJ:  60 y/o male presents for Staged LHC. Pt c/o continued dyspnea and chest heaviness.    Intervention: PCI ANALIA x 2 Resolute to mRCA and dRCA stenosis 1/26/22      Allergies    No Known Drug Allergies  shellfish (Anaphylaxis)    Intolerances      Patient without complaints. Pt ambulated without issues/symptoms  Denies CP, SOB, palpitations, or dizziness  No events on telemetry    Vital Signs Last 24 Hrs    HR: 62  BP: 102/ 70  RR: 16  SpO2: 96%           REVIEW OF SYSTEMS:          All negative except as mentioned in HPI    PHYSICAL EXAM:           CONSTITUTIONAL: Well-developed; well-nourished; in no acute distress  	SKIN: warm, dry  	HEAD: Normocephalic; atraumatic  	EYES: PERRL.  	ENT: No nasal discharge, airway clear, mucous membranes moist  	NECK: Supple; non tender.  	CARD: +S1, +S2, no murmurs, gallops, or rubs. Regular rate and rhythm    	RESP: No wheezes, rales or rhonchi. CTA B/L  	ABD: soft ntnd, + BS x 4 quadrants  	EXT: moves all extremities,  no clubbing, cyanosis or edema  	NEURO: Alert and oriented x3, no focal deficits          PSYCH: Cooperative, appropriate          VASCULAR:  +2 Rad / +2 PTs / + 2 DPs          EXTREMITY:             Right Groin:  Dressing D/C/I, PERCLOSE,  access site soft, no hematoma, no pain, + pulses, no sign of infection, no numbness  	              ECG:   EKG PENDING 1430   CBC PENDING 1430                                                                                                                  LABS:                        14.5   6.45  )-----------( 188      ( 07 Mar 2022 07:35 )             42.7     03-07    137  |  105  |  22<H>  ----------------------------<  186<H>  4.8   |  22  |  1.0    Ca    9.2      07 Mar 2022 07:35              A/P:  I discussed the case with Cardiologist Dr. Prado and recommend the following:    S/P PCI:      Intervention:   PTCA, IVUS, ANALIA x 1 to prox LAD  PTCA to D1    Implants:    4.0 x 30 Thatcher stent to prox LAD     FINDINGS:     Hemodynamics: Hemodynamic assessment demonstrates normal hemodynamics and normal LVEDP.     Ventricles: No left ventriculogram was performed.     Valves: Aortic valve: No significant aortic gradient.     Coronary circulation: The coronary circulation is right dominant. There was 2-vessel coronary artery disease (LAD and RI).     Left main: Angiography showed no evidence of disease.     LAD: The vessel was large sized. Proximal LAD: There was a tubular 95 % stenosis at a site with no prior intervention, at the origin of D1. The lesion was eccentric. There was CORRINA grade 2 flow through the vessel (partial perfusion). This lesion is a likely culprit for the patient's anginal symptoms. An intervention was performed. Mid LAD: Angiography showed mild atherosclerosis with no flow limiting lesions. Distal LAD: Angiography showed no evidence of disease. 1st diagonal: The vessel was medium sized. There was a discrete 70 % stenosis at the ostium of the vessel segment. An intervention was performed.     Circumflex: The vessel was medium sized. Angiography showed mild atherosclerosis with no flow limiting lesions. 1st obtuse marginal: Angiography showed no evidence of disease.     Ramus intermedius: The vessel was large sized. There was a discrete 70 % stenosis in the proximal third of the vessel segment.     RCA: The vessel was large sized (dominant). Angiography showed mild atherosclerosis with no flow limiting lesions. Patent stents in mid and distal RCA. Right PDA: Angiography showed no evidence of disease. Right posterolateral segment: Angiography showed mild atherosclerosis with no flow limiting lesions.         POST-OP DIAGNOSIS:    Successful staged bifurcation PCI of proximal LAD/D1 (AUC score 7).         	         Continue DAPT ( Aspirin 81 mg PO Daily and Brilinta 90mg po twice a day ),  B-Blocker, Statin Therapy                   Patient pharmacy called in for Brilinta per month, patient aware and agreeable, Rx available for pickup                   CBC @ 1430                    EKG @ 1430                   NS @150 ml/hr x 7hrs                   Monitor access site                   Patient agreeing to take DAPT for at least one year or as directed by cardiologist                    Pt given instructions on importance of taking antiplatelet medication or risk acute stent thrombosis/death                   Post cath instructions, access site care and activity restrictions reviewed with patient                     Discussed with patient to return to hospital if experience chest pain, shortness breath, dizziness and site bleeding                   Aggressive risk factor modification, diet counseling, smoking cessation discussed with patient                       Cardiac Rehab info provided/ referral and communication to cardiac rehab provided.                    Can discharge patient @1730 from cardiac standpoint after ambulating without symptoms and access site wnl and ECG reviewed                    Follow up with Cardiology Dr. Prado  in one to two weeks.  Instructed to call and make an appointment                    - No heavy lifting for 2 weeks. ( nothing greater then 10 lbs)  - May shower 24 hours after procedure  - No Driving for 2 days   - Monitor access site for bleeding or signs of infection  - Call Cardiologist for follow up appointment upon discharge  - Discuss benefits of Cardiac Rehab with Cardiologist on follow up

## 2022-03-11 DIAGNOSIS — Z91.013 ALLERGY TO SEAFOOD: ICD-10-CM

## 2022-03-11 DIAGNOSIS — E78.00 PURE HYPERCHOLESTEROLEMIA, UNSPECIFIED: ICD-10-CM

## 2022-03-11 DIAGNOSIS — I20.9 ANGINA PECTORIS, UNSPECIFIED: ICD-10-CM

## 2022-03-11 DIAGNOSIS — Z95.5 PRESENCE OF CORONARY ANGIOPLASTY IMPLANT AND GRAFT: ICD-10-CM

## 2022-03-11 DIAGNOSIS — E11.9 TYPE 2 DIABETES MELLITUS WITHOUT COMPLICATIONS: ICD-10-CM

## 2022-03-11 DIAGNOSIS — I10 ESSENTIAL (PRIMARY) HYPERTENSION: ICD-10-CM

## 2022-03-11 DIAGNOSIS — I25.119 ATHEROSCLEROTIC HEART DISEASE OF NATIVE CORONARY ARTERY WITH UNSPECIFIED ANGINA PECTORIS: ICD-10-CM

## 2022-03-11 DIAGNOSIS — Z79.82 LONG TERM (CURRENT) USE OF ASPIRIN: ICD-10-CM

## 2022-04-01 ENCOUNTER — RESULT CHARGE (OUTPATIENT)
Age: 62
End: 2022-04-01

## 2022-04-01 ENCOUNTER — APPOINTMENT (OUTPATIENT)
Dept: CARDIOLOGY | Facility: CLINIC | Age: 62
End: 2022-04-01
Payer: COMMERCIAL

## 2022-04-01 VITALS
HEART RATE: 59 BPM | BODY MASS INDEX: 27.97 KG/M2 | WEIGHT: 174 LBS | TEMPERATURE: 97.9 F | HEIGHT: 66 IN | SYSTOLIC BLOOD PRESSURE: 122 MMHG | DIASTOLIC BLOOD PRESSURE: 84 MMHG

## 2022-04-01 PROCEDURE — 99214 OFFICE O/P EST MOD 30 MIN: CPT

## 2022-04-01 PROCEDURE — 93000 ELECTROCARDIOGRAM COMPLETE: CPT

## 2022-04-01 NOTE — HISTORY OF PRESENT ILLNESS
[FreeTextEntry1] : Patient is a 62 yo male with PMH of arthritis, hx of diverticulitis s/p partial\par colon resection\par Father  at 64 from MI\par former smoker quit 8 years ago smoked 1 to  2 packs per day x 40 years. denies tobacco use, hx of  cocaine use. Quit 30 years ago \par marijuana daily. Post recent  COVID in 2020\par Post hospitalization with NSTEMI\par Cardiac cath - severe 2v cad, s/p pci pci of rca\par Staged pci of lad 3/22\par Currently feels good, no chest pain, burr \par \par \par \par

## 2022-04-01 NOTE — REVIEW OF SYSTEMS
[Feeling Fatigued] : feeling fatigued [SOB] : shortness of breath [Negative] : Heme/Lymph [FreeTextEntry5] : with moderate exertion

## 2022-04-01 NOTE — ASSESSMENT
[FreeTextEntry1] : 62 yo male with pmhx and presentation as above\par cad/ami, pci of rca with osmel\par residual severe lad ds, s/p pci\par icm\par htn/dm/dyslipidemia\par cont all meds\par hx records reviewed\par doing well\par may return to work\par aggressive risk modif\par diet and act-s as tolerated\par rtc 3-4 months

## 2022-06-24 ENCOUNTER — APPOINTMENT (OUTPATIENT)
Dept: CARDIOLOGY | Facility: CLINIC | Age: 62
End: 2022-06-24

## 2022-07-22 ENCOUNTER — APPOINTMENT (OUTPATIENT)
Dept: CARDIOLOGY | Facility: CLINIC | Age: 62
End: 2022-07-22

## 2022-07-22 VITALS
DIASTOLIC BLOOD PRESSURE: 80 MMHG | WEIGHT: 173 LBS | HEIGHT: 66 IN | SYSTOLIC BLOOD PRESSURE: 140 MMHG | HEART RATE: 77 BPM | BODY MASS INDEX: 27.8 KG/M2

## 2022-07-22 PROCEDURE — 99213 OFFICE O/P EST LOW 20 MIN: CPT | Mod: 25

## 2022-07-22 PROCEDURE — 93000 ELECTROCARDIOGRAM COMPLETE: CPT

## 2022-07-22 RX ORDER — ISOSORBIDE MONONITRATE 30 MG/1
30 TABLET, EXTENDED RELEASE ORAL DAILY
Qty: 90 | Refills: 3 | Status: DISCONTINUED | COMMUNITY
Start: 2022-02-04 | End: 2022-07-22

## 2022-07-22 NOTE — ASSESSMENT
[FreeTextEntry1] : 62 yo male with pmhx and presentation as above\par cad/ami, pci of rca with osmel\par residual severe lad ds, s/p pci\par icm\par htn/dm/dyslipidemia\par cont all meds\par d/c imdur\par monitor bp, if elevated - add arb\par repeat labs\par aggressive risk modif\par diet and act-s as tolerated\par rtc 4-6 months

## 2022-07-22 NOTE — HISTORY OF PRESENT ILLNESS
[FreeTextEntry1] : Patient is a 60 yo male with PMH of arthritis, hx of diverticulitis s/p partial\par colon resection\par Father  at 64 from MI\par former smoker quit 8 years ago smoked 1 to  2 packs per day x 40 years. denies tobacco use, hx of  cocaine use. Quit 30 years ago \par marijuana daily. Post recent  COVID in 2020\par  NSTEMI\par Cardiac cath - severe 2v cad, s/p pci pci of rca\par Staged pci of lad 3/22\par Currently feels good, no chest pain, burr \par \par \par \par 
oral

## 2022-08-08 NOTE — PATIENT PROFILE ADULT - FUNCTIONAL ASSESSMENT - BASIC MOBILITY ASSESSMENT TYPE
[de-identified] : 3/2003–Stage IIB left breast cancer, ER positive/CT positive/HER-2 negative–status post left modified radical mastectomy with reconstruction.  1/15 lymph nodes positive for metastatic carcinoma with focal extranodal extension.  Status post AC-T--> Tamoxifen x 5 years.  \par \par 6/2018-Presented with general weakness and leg pain.  Diagnosed with clear cell renal carcinoma  s/p Radical Left Nephrectomy, Para Aortic LN dissection,  c/b Splenic Laceration w/ bleeding. Right iliac bone biopsy of bone lesion was consistent with metastatic breast cancer–ER positive/CT positive/HER-2 unknown.\par Patient was begun on Letrozole/Ibrance/Xgeva.\par \par \par 3/2019–Status post left VATS procedure with pleural decortication.  Left pleural fluid cytology and pleural pathology negative for malignancy.\par 4/2020-CT scan of the chest/abdomen/pelvis revealed new left lower lung nodule and decreased right apex nodules, extensive bony metastases slightly increased. Changed to Aromasin, with Afinitor added 7/2020.\par Patient had been under the oncologic care of Dr. Shields at [facility].\par Hello,\par 8/2020–Patient wished [place] [facility] [place] venous duplex study-B/L DVT. Begun on Xarelto.\par 12/2020–CT abdomen/pelvis–extensive osseous metastases.\par 6/2021-CT C/A/P-pulmonary metastases, a distal paraesophageal node and extensive osseous metastatic disease without significant change. No acute pathology. Aromasin/Afinitor continued.\par \par 2/28/22-CT C/A/P-enlarging pulmonary nodules and a new right pulmonary nodule. Enlarging distal paraesophageal lymph node and a new left internal mammary lymph node. Stable bone metastases.\par \par 3/17/2022-Began Faslodex.\par \par 7/18/2022-CT C/A/P-Enlarging bilateral pulmonary nodules with reference nodules described above. Marked interval enlargement of a right perihilar mass with new endobronchial extension into the right mainstem bronchus. Enlarging right hilar and distal paraesophageal lymph nodes. 3.6 cm indeterminate mass in the mid to lower pole of the right kidney measuring slightly higher than simple fluid attenuation, increased in size. Further evaluation with renal ultrasound is recommended. Extensive sclerotic osseous metastases, without significant interval change.\par \par \par \par \par  [de-identified] : Infiltrating lobular carcinoma [de-identified] : 7/2020 :  CA 15-3=24 U/mL     CEA =2.9 ng/mL         [de-identified] : 10/2020–[facility] [place] [facility]–genomic findings–PIK3CA G4272D, CDH1 Y523fs*13, TBX3 R471fs*161; OTTO; TMB 1Muts/Mb.\par 3 disease relevant genes with no reportable alterations–BRCA1, BRCA2, ERBB 2.\par \par 6/2018-PD-L1 ()=0%. [de-identified] : S/P hospitalization for hemoptysis, SOB. To see radiation oncology for palliative RT-has simulation and appt. with radiation MD 8/10/22.\par Managing ADL's. Independent, lives alone. Has family available for help if needed.\par Takes oxycodone for intermittent bony/joint aches.\par No c/o dental/jaw pain. \par Has O2 at home; has portable tank now as well.\par No  nausea/vomiting/diarrhea or constipation . No melena, nose bleeds.  \par No chills, fever, night sweats, mouth sores. \par Taking calcium 1500 mg and vitamin D supplements daily.\par \par Has had COVID vaccines(Moderna), along with booster x 1 (5/8/22).\par  Admission

## 2022-11-22 ENCOUNTER — RX RENEWAL (OUTPATIENT)
Age: 62
End: 2022-11-22

## 2023-01-20 ENCOUNTER — APPOINTMENT (OUTPATIENT)
Dept: CARDIOLOGY | Facility: CLINIC | Age: 63
End: 2023-01-20
Payer: COMMERCIAL

## 2023-01-20 VITALS
WEIGHT: 168 LBS | HEIGHT: 66 IN | DIASTOLIC BLOOD PRESSURE: 74 MMHG | BODY MASS INDEX: 27 KG/M2 | HEART RATE: 65 BPM | SYSTOLIC BLOOD PRESSURE: 110 MMHG

## 2023-01-20 PROCEDURE — 93000 ELECTROCARDIOGRAM COMPLETE: CPT

## 2023-01-20 PROCEDURE — 99214 OFFICE O/P EST MOD 30 MIN: CPT | Mod: 25

## 2023-01-20 NOTE — HISTORY OF PRESENT ILLNESS
[FreeTextEntry1] : Patient is a 63 yo male with PMH of arthritis, hx of diverticulitis s/p partial\par colon resection\par Father  at 64 from [place]\par former smoker quit 8 years ago smoked 1 to  2 packs per day x 40 years. denies tobacco use, hx of  cocaine use. Quit 30 years ago \par marijuana daily. Post recent  COVID in 2020\par  NSTEMI\par Cardiac cath - severe 2v cad, s/p pci pci of rca\par Staged pci of lad 3/22\par Currently feels good, no chest pain, burr \par \par \par \par

## 2023-01-20 NOTE — ASSESSMENT
[FreeTextEntry1] : 63 yo male with pmhx and presentation as above\par cad/ami, pci of rca with osmel\par residual severe lad ds, s/p pci\par icm\par htn/dm/dyslipidemia\par cont all meds\par add arb\par repeat labs reviewed, a1c >8, f/up with pmd\par aggressive risk modif\par diet and act-s as tolerated\par rtc 4-6 months

## 2023-02-07 ENCOUNTER — RX RENEWAL (OUTPATIENT)
Age: 63
End: 2023-02-07

## 2023-02-20 ENCOUNTER — RX RENEWAL (OUTPATIENT)
Age: 63
End: 2023-02-20

## 2023-07-28 ENCOUNTER — APPOINTMENT (OUTPATIENT)
Dept: CARDIOLOGY | Facility: CLINIC | Age: 63
End: 2023-07-28
Payer: COMMERCIAL

## 2023-07-28 VITALS
WEIGHT: 167 LBS | SYSTOLIC BLOOD PRESSURE: 122 MMHG | BODY MASS INDEX: 26.84 KG/M2 | HEART RATE: 71 BPM | DIASTOLIC BLOOD PRESSURE: 72 MMHG | HEIGHT: 66 IN

## 2023-07-28 DIAGNOSIS — I25.2 OLD MYOCARDIAL INFARCTION: ICD-10-CM

## 2023-07-28 DIAGNOSIS — R73.03 PREDIABETES.: ICD-10-CM

## 2023-07-28 PROCEDURE — 93000 ELECTROCARDIOGRAM COMPLETE: CPT

## 2023-07-28 PROCEDURE — 99214 OFFICE O/P EST MOD 30 MIN: CPT | Mod: 25

## 2023-07-28 RX ORDER — TICAGRELOR 60 MG/1
60 TABLET ORAL TWICE DAILY
Qty: 180 | Refills: 3 | Status: ACTIVE | COMMUNITY
Start: 2023-02-20 | End: 1900-01-01

## 2023-07-28 RX ORDER — METFORMIN HYDROCHLORIDE 500 MG/1
500 TABLET, COATED ORAL
Qty: 180 | Refills: 3 | Status: ACTIVE | COMMUNITY
Start: 1900-01-01 | End: 1900-01-01

## 2023-07-28 NOTE — ASSESSMENT
[FreeTextEntry1] : 63 yo male with pmhx and presentation as above\par cad/ami, pci of rca with osmel\par residual severe lad ds, s/p pci\par icm\par htn/dm/dyslipidemia\par cont all meds\par change brilinta to 60 bid\par repeat labs reviewed, a1c still high\par f/up with pmd\par aggressive risk modif\par diet and act-s as tolerated\par rtc 4-6 months

## 2023-07-28 NOTE — HISTORY OF PRESENT ILLNESS
[FreeTextEntry1] : Patient is a 63 yo male with PMH of arthritis, hx of diverticulitis s/p partial colon resection\par Father  at 64 from MI\par former smoker quit 8 years ago smoked 1 to  2 packs per day x 40 years. denies tobacco use, hx of  cocaine use. Quit 30 years ago \par marijuana daily. Post recent  COVID in 2020\par  NSTEMI\par Cardiac cath - severe 2v cad, s/p pci pci of rca\par Staged pci of lad 3/22\par Currently feels good, no chest pain, burr \par DM/arthritis\par \par \par \par

## 2023-11-19 ENCOUNTER — RX RENEWAL (OUTPATIENT)
Age: 63
End: 2023-11-19

## 2023-11-19 RX ORDER — FAMOTIDINE 20 MG/1
20 TABLET, FILM COATED ORAL
Qty: 90 | Refills: 3 | Status: ACTIVE | COMMUNITY
Start: 2022-02-04 | End: 1900-01-01

## 2023-11-29 ENCOUNTER — RX RENEWAL (OUTPATIENT)
Age: 63
End: 2023-11-29

## 2023-11-29 RX ORDER — LOSARTAN POTASSIUM 25 MG/1
25 TABLET, FILM COATED ORAL DAILY
Qty: 90 | Refills: 3 | Status: ACTIVE | COMMUNITY
Start: 2023-01-20 | End: 1900-01-01

## 2023-11-29 RX ORDER — METOPROLOL SUCCINATE 100 MG/1
100 TABLET, EXTENDED RELEASE ORAL
Qty: 90 | Refills: 3 | Status: ACTIVE | COMMUNITY
Start: 2022-02-04 | End: 1900-01-01

## 2024-01-07 ENCOUNTER — RX RENEWAL (OUTPATIENT)
Age: 64
End: 2024-01-07

## 2024-01-07 RX ORDER — ASPIRIN 81 MG/1
81 TABLET, COATED ORAL
Qty: 90 | Refills: 3 | Status: ACTIVE | COMMUNITY
Start: 2023-02-07 | End: 1900-01-01

## 2024-01-07 RX ORDER — ATORVASTATIN CALCIUM 80 MG/1
80 TABLET, FILM COATED ORAL
Qty: 90 | Refills: 3 | Status: ACTIVE | COMMUNITY
Start: 2023-02-20 | End: 1900-01-01

## 2024-01-12 ENCOUNTER — APPOINTMENT (OUTPATIENT)
Dept: CARDIOLOGY | Facility: CLINIC | Age: 64
End: 2024-01-12
Payer: COMMERCIAL

## 2024-01-12 VITALS
SYSTOLIC BLOOD PRESSURE: 110 MMHG | DIASTOLIC BLOOD PRESSURE: 70 MMHG | WEIGHT: 159 LBS | HEIGHT: 66 IN | HEART RATE: 82 BPM | BODY MASS INDEX: 25.55 KG/M2

## 2024-01-12 DIAGNOSIS — I10 ESSENTIAL (PRIMARY) HYPERTENSION: ICD-10-CM

## 2024-01-12 DIAGNOSIS — I25.10 ATHEROSCLEROTIC HEART DISEASE OF NATIVE CORONARY ARTERY W/OUT ANGINA PECTORIS: ICD-10-CM

## 2024-01-12 DIAGNOSIS — Z95.5 PRESENCE OF CORONARY ANGIOPLASTY IMPLANT AND GRAFT: ICD-10-CM

## 2024-01-12 DIAGNOSIS — E78.5 HYPERLIPIDEMIA, UNSPECIFIED: ICD-10-CM

## 2024-01-12 PROCEDURE — 99214 OFFICE O/P EST MOD 30 MIN: CPT | Mod: 25

## 2024-01-12 PROCEDURE — 93000 ELECTROCARDIOGRAM COMPLETE: CPT

## 2024-01-12 NOTE — HISTORY OF PRESENT ILLNESS
[FreeTextEntry1] : Patient is a 62 yo male with PMH of arthritis, hx of diverticulitis s/p partial colon resection strong fhx of cad, former smoker quit 8 years ago smoked 1 to 2 packs per day x 40 years hx of  cocaine use. Quit 30 years ago  1/22 NSTEMI Cardiac cath - severe 2v cad, s/p pci pci of rca Staged pci of lad 3/22 Weight loss New onset exertional cp DM/arthritis

## 2024-01-12 NOTE — ASSESSMENT
[FreeTextEntry1] : 64 yo male with pmhx and presentation as above cad/ami, pci of rca  and lad with osmel icm htn/dm/dyslipidemia cont all meds repeat labs done today, will call in results monitor cp, if progression - cath endo eval aggressive risk modif diet and act-s as tolerated rtc 2-3 months.

## 2024-05-28 ENCOUNTER — APPOINTMENT (OUTPATIENT)
Dept: CARDIOLOGY | Facility: CLINIC | Age: 64
End: 2024-05-28

## 2024-06-27 ENCOUNTER — APPOINTMENT (OUTPATIENT)
Dept: ENDOCRINOLOGY | Facility: CLINIC | Age: 64
End: 2024-06-27

## 2024-07-01 NOTE — DISCHARGE NOTE PROVIDER - NSDCCPGOAL_GEN_ALL_CORE_FT
Please call daughter and let her know her dad's blood tests are all okay except for his vitamin B12 is quite low.  Have him start taking vitamin B12 2500 mcg a day orally over-the-counter and arrange a repeat vitamin D B12 level in 1 month.  This could be a source of his memory getting worse and he was slowing down quite a bit. To get better and follow your care plan as instructed.

## 2024-09-27 ENCOUNTER — NON-APPOINTMENT (OUTPATIENT)
Age: 64
End: 2024-09-27

## 2024-10-11 ENCOUNTER — APPOINTMENT (OUTPATIENT)
Dept: SURGERY | Facility: CLINIC | Age: 64
End: 2024-10-11

## 2024-10-14 ENCOUNTER — APPOINTMENT (OUTPATIENT)
Dept: SURGERY | Facility: CLINIC | Age: 64
End: 2024-10-14
Payer: COMMERCIAL

## 2024-10-14 VITALS
SYSTOLIC BLOOD PRESSURE: 90 MMHG | HEART RATE: 65 BPM | BODY MASS INDEX: 23.95 KG/M2 | OXYGEN SATURATION: 95 % | WEIGHT: 149 LBS | DIASTOLIC BLOOD PRESSURE: 60 MMHG | HEIGHT: 66 IN

## 2024-10-14 PROCEDURE — 99203 OFFICE O/P NEW LOW 30 MIN: CPT

## 2024-11-01 ENCOUNTER — APPOINTMENT (OUTPATIENT)
Dept: CARDIOLOGY | Facility: CLINIC | Age: 64
End: 2024-11-01
Payer: COMMERCIAL

## 2024-11-01 VITALS
BODY MASS INDEX: 24.27 KG/M2 | HEART RATE: 73 BPM | WEIGHT: 151 LBS | HEIGHT: 66 IN | SYSTOLIC BLOOD PRESSURE: 112 MMHG | DIASTOLIC BLOOD PRESSURE: 78 MMHG

## 2024-11-01 DIAGNOSIS — E78.5 HYPERLIPIDEMIA, UNSPECIFIED: ICD-10-CM

## 2024-11-01 DIAGNOSIS — R73.03 PREDIABETES.: ICD-10-CM

## 2024-11-01 DIAGNOSIS — Z95.5 PRESENCE OF CORONARY ANGIOPLASTY IMPLANT AND GRAFT: ICD-10-CM

## 2024-11-01 DIAGNOSIS — Z01.810 ENCOUNTER FOR PREPROCEDURAL CARDIOVASCULAR EXAMINATION: ICD-10-CM

## 2024-11-01 DIAGNOSIS — I25.2 OLD MYOCARDIAL INFARCTION: ICD-10-CM

## 2024-11-01 DIAGNOSIS — I25.10 ATHEROSCLEROTIC HEART DISEASE OF NATIVE CORONARY ARTERY W/OUT ANGINA PECTORIS: ICD-10-CM

## 2024-11-01 DIAGNOSIS — I10 ESSENTIAL (PRIMARY) HYPERTENSION: ICD-10-CM

## 2024-11-01 PROCEDURE — 93000 ELECTROCARDIOGRAM COMPLETE: CPT

## 2024-11-01 PROCEDURE — 99214 OFFICE O/P EST MOD 30 MIN: CPT | Mod: 25

## 2024-11-30 ENCOUNTER — RX RENEWAL (OUTPATIENT)
Age: 64
End: 2024-11-30

## 2025-03-05 ENCOUNTER — RX RENEWAL (OUTPATIENT)
Age: 65
End: 2025-03-05

## 2025-05-09 ENCOUNTER — APPOINTMENT (OUTPATIENT)
Dept: CARDIOLOGY | Facility: CLINIC | Age: 65
End: 2025-05-09

## 2025-06-13 ENCOUNTER — INPATIENT (INPATIENT)
Facility: HOSPITAL | Age: 65
LOS: 2 days | Discharge: HOME | DRG: 812 | End: 2025-06-16
Attending: HOSPITALIST | Admitting: INTERNAL MEDICINE
Payer: COMMERCIAL

## 2025-06-13 VITALS
RESPIRATION RATE: 18 BRPM | DIASTOLIC BLOOD PRESSURE: 82 MMHG | HEART RATE: 110 BPM | HEIGHT: 66 IN | SYSTOLIC BLOOD PRESSURE: 116 MMHG | WEIGHT: 145.06 LBS | TEMPERATURE: 98 F | OXYGEN SATURATION: 98 %

## 2025-06-13 DIAGNOSIS — Z95.5 PRESENCE OF CORONARY ANGIOPLASTY IMPLANT AND GRAFT: Chronic | ICD-10-CM

## 2025-06-13 DIAGNOSIS — D64.9 ANEMIA, UNSPECIFIED: ICD-10-CM

## 2025-06-13 DIAGNOSIS — Z90.49 ACQUIRED ABSENCE OF OTHER SPECIFIED PARTS OF DIGESTIVE TRACT: Chronic | ICD-10-CM

## 2025-06-13 LAB
ALBUMIN SERPL ELPH-MCNC: 3.7 G/DL — SIGNIFICANT CHANGE UP (ref 3.5–5.2)
ALP SERPL-CCNC: 146 U/L — HIGH (ref 30–115)
ALT FLD-CCNC: 10 U/L — SIGNIFICANT CHANGE UP (ref 0–41)
ANION GAP SERPL CALC-SCNC: 11 MMOL/L — SIGNIFICANT CHANGE UP (ref 7–14)
ANISOCYTOSIS BLD QL: SLIGHT — SIGNIFICANT CHANGE UP
APTT BLD: 25.9 SEC — LOW (ref 27–39.2)
AST SERPL-CCNC: 10 U/L — SIGNIFICANT CHANGE UP (ref 0–41)
BASOPHILS # BLD AUTO: 0.06 K/UL — SIGNIFICANT CHANGE UP (ref 0–0.2)
BASOPHILS NFR BLD AUTO: 1.8 % — HIGH (ref 0–1)
BILIRUB SERPL-MCNC: 0.8 MG/DL — SIGNIFICANT CHANGE UP (ref 0.2–1.2)
BLD GP AB SCN SERPL QL: SIGNIFICANT CHANGE UP
BUN SERPL-MCNC: 30 MG/DL — HIGH (ref 10–20)
CALCIUM SERPL-MCNC: 8.9 MG/DL — SIGNIFICANT CHANGE UP (ref 8.4–10.5)
CHLORIDE SERPL-SCNC: 105 MMOL/L — SIGNIFICANT CHANGE UP (ref 98–110)
CO2 SERPL-SCNC: 19 MMOL/L — SIGNIFICANT CHANGE UP (ref 17–32)
CREAT SERPL-MCNC: 1.2 MG/DL — SIGNIFICANT CHANGE UP (ref 0.7–1.5)
EGFR: 68 ML/MIN/1.73M2 — SIGNIFICANT CHANGE UP
EGFR: 68 ML/MIN/1.73M2 — SIGNIFICANT CHANGE UP
EOSINOPHIL # BLD AUTO: 0.15 K/UL — SIGNIFICANT CHANGE UP (ref 0–0.7)
EOSINOPHIL NFR BLD AUTO: 4.5 % — SIGNIFICANT CHANGE UP (ref 0–8)
GIANT PLATELETS BLD QL SMEAR: PRESENT — SIGNIFICANT CHANGE UP
GLUCOSE SERPL-MCNC: 283 MG/DL — HIGH (ref 70–99)
HCT VFR BLD CALC: 16.7 % — LOW (ref 42–52)
HGB BLD-MCNC: 5.3 G/DL — CRITICAL LOW (ref 14–18)
HYPOCHROMIA BLD QL: SLIGHT — SIGNIFICANT CHANGE UP
INR BLD: 1.2 RATIO — SIGNIFICANT CHANGE UP (ref 0.65–1.3)
IRON SATN MFR SERPL: 134 UG/DL — SIGNIFICANT CHANGE UP (ref 35–150)
IRON SATN MFR SERPL: 65 % — HIGH (ref 15–50)
LYMPHOCYTES # BLD AUTO: 0.97 K/UL — LOW (ref 1.2–3.4)
LYMPHOCYTES # BLD AUTO: 29.5 % — SIGNIFICANT CHANGE UP (ref 20.5–51.1)
MACROCYTES BLD QL: SLIGHT — SIGNIFICANT CHANGE UP
MANUAL SMEAR VERIFICATION: SIGNIFICANT CHANGE UP
MCHC RBC-ENTMCNC: 30.8 PG — SIGNIFICANT CHANGE UP (ref 27–31)
MCHC RBC-ENTMCNC: 31.7 G/DL — LOW (ref 32–37)
MCV RBC AUTO: 97.1 FL — HIGH (ref 80–94)
MICROCYTES BLD QL: SLIGHT — SIGNIFICANT CHANGE UP
MONOCYTES # BLD AUTO: 0.38 K/UL — SIGNIFICANT CHANGE UP (ref 0.1–0.6)
MONOCYTES NFR BLD AUTO: 11.6 % — HIGH (ref 1.7–9.3)
MYELOCYTES NFR BLD: 0.9 % — HIGH (ref 0–0)
NEUTROPHILS # BLD AUTO: 1.5 K/UL — SIGNIFICANT CHANGE UP (ref 1.4–6.5)
NEUTROPHILS NFR BLD AUTO: 45.5 % — SIGNIFICANT CHANGE UP (ref 42.2–75.2)
OVALOCYTES BLD QL SMEAR: SLIGHT — SIGNIFICANT CHANGE UP
PLAT MORPH BLD: ABNORMAL
PLATELET # BLD AUTO: 115 K/UL — LOW (ref 130–400)
PMV BLD: 12.2 FL — HIGH (ref 7.4–10.4)
POIKILOCYTOSIS BLD QL AUTO: SLIGHT — SIGNIFICANT CHANGE UP
POTASSIUM SERPL-MCNC: 4.3 MMOL/L — SIGNIFICANT CHANGE UP (ref 3.5–5)
POTASSIUM SERPL-SCNC: 4.3 MMOL/L — SIGNIFICANT CHANGE UP (ref 3.5–5)
PROT SERPL-MCNC: 6.6 G/DL — SIGNIFICANT CHANGE UP (ref 6–8)
PROTHROM AB SERPL-ACNC: 14.2 SEC — HIGH (ref 9.95–12.87)
RBC # BLD: 1.72 M/UL — LOW (ref 4.7–6.1)
RBC # FLD: 21.5 % — HIGH (ref 11.5–14.5)
RBC BLD AUTO: ABNORMAL
SODIUM SERPL-SCNC: 135 MMOL/L — SIGNIFICANT CHANGE UP (ref 135–146)
TIBC SERPL-MCNC: 205 UG/DL — LOW (ref 220–430)
TRANSFERRIN SERPL-MCNC: 169 MG/DL — LOW (ref 200–360)
TROPONIN T, HIGH SENSITIVITY RESULT: 18 NG/L — SIGNIFICANT CHANGE UP (ref 6–21)
UIBC SERPL-MCNC: 71 UG/DL — LOW (ref 110–370)
VARIANT LYMPHS # BLD: 6.2 % — HIGH (ref 0–5)
VARIANT LYMPHS NFR BLD MANUAL: 6.2 % — HIGH (ref 0–5)
WBC # BLD: 3.3 K/UL — LOW (ref 4.8–10.8)
WBC # FLD AUTO: 3.3 K/UL — LOW (ref 4.8–10.8)

## 2025-06-13 PROCEDURE — 81451 HL NEO GSAP 5-50 RNA ALYS: CPT

## 2025-06-13 PROCEDURE — 88313 SPECIAL STAINS GROUP 2: CPT

## 2025-06-13 PROCEDURE — 88271 CYTOGENETICS DNA PROBE: CPT

## 2025-06-13 PROCEDURE — 86790 VIRUS ANTIBODY NOS: CPT

## 2025-06-13 PROCEDURE — 88305 TISSUE EXAM BY PATHOLOGIST: CPT

## 2025-06-13 PROCEDURE — 81450 HL NEO GSAP 5-50DNA/DNA&RNA: CPT

## 2025-06-13 PROCEDURE — 99285 EMERGENCY DEPT VISIT HI MDM: CPT

## 2025-06-13 PROCEDURE — 85045 AUTOMATED RETICULOCYTE COUNT: CPT

## 2025-06-13 PROCEDURE — 88311 DECALCIFY TISSUE: CPT

## 2025-06-13 PROCEDURE — 93307 TTE W/O DOPPLER COMPLETE: CPT

## 2025-06-13 PROCEDURE — 88275 CYTOGENETICS 100-300: CPT

## 2025-06-13 PROCEDURE — 88237 TISSUE CULTURE BONE MARROW: CPT

## 2025-06-13 PROCEDURE — 88185 FLOWCYTOMETRY/TC ADD-ON: CPT

## 2025-06-13 PROCEDURE — 83036 HEMOGLOBIN GLYCOSYLATED A1C: CPT

## 2025-06-13 PROCEDURE — 83735 ASSAY OF MAGNESIUM: CPT

## 2025-06-13 PROCEDURE — 80048 BASIC METABOLIC PNL TOTAL CA: CPT

## 2025-06-13 PROCEDURE — 80074 ACUTE HEPATITIS PANEL: CPT

## 2025-06-13 PROCEDURE — 88341 IMHCHEM/IMCYTCHM EA ADD ANTB: CPT

## 2025-06-13 PROCEDURE — 85025 COMPLETE CBC W/AUTO DIFF WBC: CPT

## 2025-06-13 PROCEDURE — 71260 CT THORAX DX C+: CPT

## 2025-06-13 PROCEDURE — 93010 ELECTROCARDIOGRAM REPORT: CPT

## 2025-06-13 PROCEDURE — 83615 LACTATE (LD) (LDH) ENZYME: CPT

## 2025-06-13 PROCEDURE — 82607 VITAMIN B-12: CPT

## 2025-06-13 PROCEDURE — 71045 X-RAY EXAM CHEST 1 VIEW: CPT | Mod: 26

## 2025-06-13 PROCEDURE — 88184 FLOWCYTOMETRY/ TC 1 MARKER: CPT

## 2025-06-13 PROCEDURE — 82746 ASSAY OF FOLIC ACID SERUM: CPT

## 2025-06-13 PROCEDURE — 85027 COMPLETE CBC AUTOMATED: CPT

## 2025-06-13 PROCEDURE — 36415 COLL VENOUS BLD VENIPUNCTURE: CPT

## 2025-06-13 PROCEDURE — 99223 1ST HOSP IP/OBS HIGH 75: CPT

## 2025-06-13 PROCEDURE — 83010 ASSAY OF HAPTOGLOBIN QUANT: CPT

## 2025-06-13 PROCEDURE — 74177 CT ABD & PELVIS W/CONTRAST: CPT

## 2025-06-13 PROCEDURE — 80053 COMPREHEN METABOLIC PANEL: CPT

## 2025-06-13 PROCEDURE — 87205 SMEAR GRAM STAIN: CPT

## 2025-06-13 PROCEDURE — 88342 IMHCHEM/IMCYTCHM 1ST ANTB: CPT

## 2025-06-13 PROCEDURE — 82962 GLUCOSE BLOOD TEST: CPT

## 2025-06-13 PROCEDURE — P9016: CPT

## 2025-06-13 PROCEDURE — 36430 TRANSFUSION BLD/BLD COMPNT: CPT

## 2025-06-13 NOTE — ED PROVIDER NOTE - DIFFERENTIAL DIAGNOSIS
differential dx includes but is not limited to:  anemia, end organ damage, ACS, electrolyte derangement, arrhythmia Differential Diagnosis

## 2025-06-13 NOTE — H&P ADULT - ATTENDING COMMENTS
HPI:  65 yo M with hx of CAD s/p PCI on ASA/brilinta, HTN, HLD, DM who presents with sob, malaise, fatigue, tingling in hands, BLE swelling x3 wks. Says he gets sob walking <10 ft which si not normal for him. Noticed some dark tarry stools. Went to PMD ( DR Florentino) today for routine blood work and was called that he needed a blood transfusion so advised to go to ED. No fever, cp, abd pain, hematochezia, calf swelling/pain, hx of clots, hormone use, recent immobilization/surg, malignancy, hemoptysis. No hx of GIB or blood transfusion. Says last colonoscopy was 7 yrs ago when he had a fistula requiring partial colon resection. Per YOBANI, last hgb 14.1 on 3/7/22.    in the ED:  , /82, T36.9, RR 18, spO2 98%  labs: wbc 3.3, Hb 5.3, plts 115  Na 135, K 4.3, cr 1.2, bicarb 19, BUN 30  CXR doesn't look congested on my read    Pt received 2units prbc & admitted for melena.   (13 Jun 2025 23:41)    Physical Exam:  General: WN/WD NAD  Neurology: A&Ox3, nonfocal, follows commands  Eyes: PERRLA/ EOMI  ENT/Neck: Neck supple, trachea midline, No JVD  Respiratory: CTA B/L, No wheezing, rales, rhonchi  CV: Normal rate regular rhythm, S1S2, no murmurs, rubs or gallops  Abdominal: Soft, NT, ND +BS,   Extremities: (+) pedal edema, + peripheral pulses  Skin: No Rashes, Hematoma, Ecchymosis    A/p  Pancytopenia r/o BM suppression vs. GI loss / melena   Symptomatic anemia / Dyspnea   -transfuse PRBC and serial CBC   -check retic count, LDH ( doubt hemolysis - T Bili nl)  -NPO, IV PPI, IV fluids   -GI eval and Hematology eval     H/o NSTEMI / Dyslipidemia   H/o HTN   -hold Brilinta / antihypertensive and c/w other Rx     DM type II  -Lantus / Lispro AC w/ F/s monitoring and ISS PRN    DVT prophylaxis- SCDs to LE while in bed    PATIENT SEEN by ATTENDING 6/13/25

## 2025-06-13 NOTE — ED PROVIDER NOTE - NSICDXPASTSURGICALHX_GEN_ALL_CORE_FT
PAST SURGICAL HISTORY:  History of colon resection     History of coronary artery stent placement

## 2025-06-13 NOTE — H&P ADULT - HISTORY OF PRESENT ILLNESS
65 yo M with hx of CAD s/p PCI on ASA/brilinta, HTN, HLD, DM who presents with sob, malaise, fatigue, tingling in hands, BLE swelling x3 wks. Says he gets sob walking <10 ft which si not normal for him. Noticed some dark tarry stools. Went to PMD ( DR Florentino) today for routine blood work and was called that he needed a blood transfusion so advised to go to ED. No fever, cp, abd pain, hematochezia, calf swelling/pain, hx of clots, hormone use, recent immobilization/surg, malignancy, hemoptysis. No hx of GIB or blood transfusion. Says last colonoscopy was 7 yrs ago when he had a fistula requiring partial colon resection. Per YOBANI, last hgb 14.1 on 3/7/22.    in the ED:  , /82, T36.9, RR 18, spO2 98%  labs: wbc 3.3, Hb 5.3, plts 115  Na 135, K 4.3, cr 1.2, bicarb 19, BUN 30  CXR doesn't look congested on my read    Pt received 2units prbc & admitted for melena.

## 2025-06-13 NOTE — H&P ADULT - NSHPLABSRESULTS_GEN_ALL_CORE
5.3    3.30  )-----------( 115      ( 13 Jun 2025 21:49 )             16.7       06-13    135  |  105  |  30[H]  ----------------------------<  283[H]  4.3   |  19  |  1.2    Ca    8.9      13 Jun 2025 21:49    TPro  6.6  /  Alb  3.7  /  TBili  0.8  /  DBili  x   /  AST  10  /  ALT  10  /  AlkPhos  146[H]  06-13                  Urinalysis Basic - ( 13 Jun 2025 21:49 )    Color: x / Appearance: x / SG: x / pH: x  Gluc: 283 mg/dL / Ketone: x  / Bili: x / Urobili: x   Blood: x / Protein: x / Nitrite: x   Leuk Esterase: x / RBC: x / WBC x   Sq Epi: x / Non Sq Epi: x / Bacteria: x        PT/INR - ( 13 Jun 2025 21:49 )   PT: 14.20 sec;   INR: 1.20 ratio         PTT - ( 13 Jun 2025 21:49 )  PTT:25.9 sec    Lactate Trend

## 2025-06-13 NOTE — H&P ADULT - ASSESSMENT
63 yo M with hx of CAD s/p PCI on ASA/brilinta, HTN, HLD, DM, hx of diverticulitis with fistula s/p partial colon resection,  who presents with sob, malaise, fatigue, tingling in hands, BLE swelling x3 wks. Says he gets sob walking <10 ft which si not normal for him. Noticed some dark tarry stools. Went to PMD ( DR Florentino) today for routine blood work and was called that he needed a blood transfusion so advised to go to ED. No fever, cp, abd pain, hematochezia, calf swelling/pain, hx of clots, hormone use, recent immobilization/surg, malignancy, hemoptysis. No hx of GIB or blood transfusion. Says last colonoscopy was 7 yrs ago when he had a fistula requiring partial colon resection. Per YOBANI, last hgb 14.1 on 3/7/22.        #Melena  #Acute anemia with Hb 5.3  #SOB & MATT  #Hx of diverticulitis s/p partial colon resection  7y ago  -presents with sob, malaise, fatigue, tingling in hands, BLE swelling x3 wks. Says he gets sob walking <10 ft   -having tarry black stools  - in the ED: pt HD stable, tachycardic  -was given 2 units prbc  -started on PPI IV bid  - f/u GI consult & NPO for now      #H/o NSTEMI in January 2022   #Hx of HTN & HLD  -s/p PCI of mid and distal RCA    #Misc:  - GI prophylaxis: PPI IV bid  - DVT prophylaxis: hold for now  - Diet: NPO   63 yo M with hx of CAD s/p PCI on ASA/brilinta, HTN, HLD, DM, hx of diverticulitis with fistula s/p partial colon resection,  who presents with sob, malaise, fatigue, tingling in hands, BLE swelling x3 wks. Says he gets sob walking <10 ft which si not normal for him. Noticed some dark tarry stools. Went to PMD ( DR Florentino) today for routine blood work and was called that he needed a blood transfusion so advised to go to ED. No fever, cp, abd pain, hematochezia, calf swelling/pain, hx of clots, hormone use, recent immobilization/surg, malignancy, hemoptysis. No hx of GIB or blood transfusion. Says last colonoscopy was 7 yrs ago when he had a fistula requiring partial colon resection. Per YOBANI, last hgb 14.1 on 3/7/22.        #Melena  #Acute anemia with Hb 5.3  #SOB & MATT  #Hx of diverticulitis s/p partial colon resection  7y ago  -presents with sob, malaise, fatigue, tingling in hands, BLE swelling x3 wks. Says he gets sob walking <10 ft   -having tarry black stools  - in the ED: pt HD stable, tachycardic  -was given 2 units prbc  -started on PPI IV bid  - f/u GI consult & NPO for now  -SOB likely from acute severe anemia, but no previous TTE, will order      #H/o NSTEMI in January 2022   #Hx of HTN & HLD  -s/p PCI of mid and distal RCA  -c/w home meds    #Misc:  - GI prophylaxis: PPI IV bid  - DVT prophylaxis: hold for now  - Diet: NPO   65 yo M with hx of CAD s/p 3 PCI on 2022 on ASA/brilinta, HTN, HLD, DM, hx of diverticulitis with fistula s/p partial colon resection,  who presents with sob, malaise, fatigue, tingling in hands, BLE swelling. Says he gets dyepneic walking <10 ft for weeks which si not normal for him. Noticed some dark tarry stools since 2d ago. Went to PMD ( DR Florentino) today for routine blood work and was called that he needed a blood transfusion so advised to go to ED. No fever, cp, abd pain, hematochezia, calf swelling/pain, hx of clots, hormone use, recent immobilization/surg, malignancy, hemoptysis. No hx of GIB or blood transfusion. Says last colonoscopy was 7 yrs ago when he had a fistula requiring partial colon resection. Per HIE, last hgb 14.1 on 3/7/22.    #Melena  #Acute anemia with Hb 5.3  #SOB & MATT  #Hx of diverticulitis s/p partial colon resection  7y ago  -presents with sob, malaise, fatigue, tingling in hands, LE swelling, & dyspnea x3 wks   -having tarry black stools, noticed since 2d  - in the ED: pt HD stable, tachycardic  -per ED, ASHVIN with brown stools  -on my exam, pt not overloaded, no LLE, looks dry  -2 units prbc ordered, 1st unit running now, f/u cbc in am  -started on PPI IV bid  - f/u GI consult & NPO for now  -SOB likely from acute severe anemia, but no previous TTE, will order    #H/o NSTEMI in January 2022   #Hx of HTN & HLD  -s/p 3 PCIs  -takes aspirin, brilinta  -will hold brilinta for now    #DM  -takes metformin & jardiance  -start insulin & adjust as needed    - GI prophylaxis: PPI IV bid  - DVT prophylaxis: hold for now  - Diet: NPO

## 2025-06-13 NOTE — ED PROVIDER NOTE - PROGRESS NOTE DETAILS
TC: Pt here with sx of symptomatic anemia with outpatient labs showing anemia requiring transfusion (pt does not know exact number). HR 100s, rest of vitals reassuring. Low suspicion for PE given no PE risk factors, no tachypnea, no hypoxemia. Plan for labs, ekg, cxr r/o anemia, end organ damage, ACS, electrolyte derangement, arrhythmia. TC: Pt signed consent form for blood transfusion. He understands and agrees with plan for admission for workup of new onset anemia. Signed out to Dr. Watkins to f/u labs. TC: Pt here with sx of symptomatic anemia with outpatient labs showing anemia requiring transfusion (pt does not know exact number). HR 100s, rest of vitals reassuring. ASHVIN negative for blood. Low suspicion for PE given no PE risk factors, no tachypnea, no hypoxemia. Plan for labs, ekg, cxr r/o anemia, end organ damage, ACS, electrolyte derangement, arrhythmia.

## 2025-06-13 NOTE — ED PROVIDER NOTE - OBJECTIVE STATEMENT
65 yo M with hx of CAD s/p PCI on ASA/brilinta, HTN, HLD, DM who presents with sob, malaise, fatigue, tingling in hands, BLE swelling x3 wks. Says he gets sob walking <10 ft which si not normal for him. Noticed some dark tarry stools. Went to [facility]D today for routine blood work and was called that he needed a blood transfusion so advised to go to ED. No fever, cp, abd pain, hematochezia, calf swelling/pain, hx of clots, hormone use, recent immobilization/surg, malignancy, hemoptysis. No hx of GIB or blood transfusion. Says last colonoscopy was 7 yrs ago when he had a fistula requiring partial colon resection. Per YOBANI, last hgb 14.1 on 3/7/22.    PMD Dr. King

## 2025-06-13 NOTE — ED PROVIDER NOTE - CLINICAL SUMMARY MEDICAL DECISION MAKING FREE TEXT BOX
Pt here with sx of symptomatic anemia with outpatient labs showing anemia requiring transfusion (pt does not know exact number). HR 100s, rest of vitals reassuring. ASHVIN negative for blood. Low suspicion for PE given no PE risk factors, no tachypnea, no hypoxemia. Plan for labs, ekg, cxr r/o anemia, end organ damage, ACS, electrolyte derangement, arrhythmia. Pt signed consent form for blood transfusion. He understands and agrees with plan for admission for workup of new onset anemia given risk for progression to worsening anemia, end organ damage, hemorrhagic shock, etc if not closely monitored and tx'ed.

## 2025-06-13 NOTE — H&P ADULT - NSHPPHYSICALEXAM_GEN_ALL_CORE
T(C): 36.7 (06-13-25 @ 23:56), Max: 36.9 (06-13-25 @ 19:34)  HR: 61 (06-13-25 @ 23:56) (61 - 110)  BP: 103/68 (06-13-25 @ 23:56) (103/68 - 116/82)  RR: 18 (06-13-25 @ 23:56) (18 - 18)  SpO2: 98% (06-13-25 @ 23:56) (98% - 98%)    CONSTITUTIONAL: Well groomed, no apparent distress  RESP: No respiratory distress, no use of accessory muscles  CV: RRR, +S1S2, no MRG; no JVD; no peripheral edema  GI: Soft, NT, ND, no rebound, no guarding; no palpable masses

## 2025-06-13 NOTE — ED PROVIDER NOTE - NSICDXPASTMEDICALHX_GEN_ALL_CORE_FT
PAST MEDICAL HISTORY:  Arthritis     CAD (coronary artery disease)     DM (diabetes mellitus)     HLD (hyperlipidemia)     HTN (hypertension)

## 2025-06-13 NOTE — ED PROVIDER NOTE - PHYSICAL EXAMINATION
CONSTITUTIONAL: well developed, nontoxic appearing, in no acute distress, speaking in full sentences  SKIN: warm, dry, no rash, cap refill < 2 seconds  HEENT: normocephalic, atraumatic, no conjunctival erythema, moist mucous membranes, patent airway, conjunctival pallor  NECK: supple  CV:  tachycardic rate, regular rhythm, 2+ radial pulses bilaterally  RESP: no wheezes, no rales, no rhonchi, normal work of breathing  ABD: soft, no tenderness, nondistended, no rebound, no guarding  RECTAL: nontender hemorrhoid at 12 o'clock, brown stool per rectum, no kimberli hematochezia or melena  MSK: normal ROM, no cyanosis, nonpitting pedal edema bilaterally  NEURO: alert, oriented, grossly unremarkable, normal gait but gets winded  PSYCH: cooperative, appropriate

## 2025-06-13 NOTE — ED ADULT TRIAGE NOTE - CHIEF COMPLAINT QUOTE
Pt got called from MD was told hgb was low from bloodwork. Pt c/o fatigue,weak, and SOB. Pt got called from MD was told hgb was low from blood work. Pt c/o fatigue,weak, and SOB.

## 2025-06-14 ENCOUNTER — RESULT REVIEW (OUTPATIENT)
Age: 65
End: 2025-06-14

## 2025-06-14 LAB
A1C WITH ESTIMATED AVERAGE GLUCOSE RESULT: 6.7 % — HIGH (ref 4–5.6)
ALBUMIN SERPL ELPH-MCNC: 3.5 G/DL — SIGNIFICANT CHANGE UP (ref 3.5–5.2)
ALP SERPL-CCNC: 124 U/L — HIGH (ref 30–115)
ALT FLD-CCNC: 8 U/L — SIGNIFICANT CHANGE UP (ref 0–41)
ANION GAP SERPL CALC-SCNC: 9 MMOL/L — SIGNIFICANT CHANGE UP (ref 7–14)
AST SERPL-CCNC: 8 U/L — SIGNIFICANT CHANGE UP (ref 0–41)
BILIRUB SERPL-MCNC: 1 MG/DL — SIGNIFICANT CHANGE UP (ref 0.2–1.2)
BUN SERPL-MCNC: 21 MG/DL — HIGH (ref 10–20)
CALCIUM SERPL-MCNC: 8.3 MG/DL — LOW (ref 8.4–10.5)
CHLORIDE SERPL-SCNC: 106 MMOL/L — SIGNIFICANT CHANGE UP (ref 98–110)
CO2 SERPL-SCNC: 19 MMOL/L — SIGNIFICANT CHANGE UP (ref 17–32)
CREAT SERPL-MCNC: 1.1 MG/DL — SIGNIFICANT CHANGE UP (ref 0.7–1.5)
EGFR: 75 ML/MIN/1.73M2 — SIGNIFICANT CHANGE UP
EGFR: 75 ML/MIN/1.73M2 — SIGNIFICANT CHANGE UP
ESTIMATED AVERAGE GLUCOSE: 146 MG/DL — HIGH (ref 68–114)
GLUCOSE BLDC GLUCOMTR-MCNC: 162 MG/DL — HIGH (ref 70–99)
GLUCOSE BLDC GLUCOMTR-MCNC: 174 MG/DL — HIGH (ref 70–99)
GLUCOSE BLDC GLUCOMTR-MCNC: 293 MG/DL — HIGH (ref 70–99)
GLUCOSE SERPL-MCNC: 154 MG/DL — HIGH (ref 70–99)
HCT VFR BLD CALC: 23.5 % — LOW (ref 42–52)
HGB BLD-MCNC: 7.7 G/DL — LOW (ref 14–18)
LDH SERPL L TO P-CCNC: 231 U/L — SIGNIFICANT CHANGE UP (ref 50–242)
MCHC RBC-ENTMCNC: 31.2 PG — HIGH (ref 27–31)
MCHC RBC-ENTMCNC: 32.8 G/DL — SIGNIFICANT CHANGE UP (ref 32–37)
MCV RBC AUTO: 95.1 FL — HIGH (ref 80–94)
NRBC BLD AUTO-RTO: 0 /100 WBCS — SIGNIFICANT CHANGE UP (ref 0–0)
PLATELET # BLD AUTO: 75 K/UL — LOW (ref 130–400)
PMV BLD: 11.4 FL — HIGH (ref 7.4–10.4)
POTASSIUM SERPL-MCNC: 4.3 MMOL/L — SIGNIFICANT CHANGE UP (ref 3.5–5)
POTASSIUM SERPL-SCNC: 4.3 MMOL/L — SIGNIFICANT CHANGE UP (ref 3.5–5)
PROT SERPL-MCNC: 6.4 G/DL — SIGNIFICANT CHANGE UP (ref 6–8)
RBC # BLD: 2.47 M/UL — LOW (ref 4.7–6.1)
RBC # BLD: 2.47 M/UL — LOW (ref 4.7–6.1)
RBC # FLD: 18.4 % — HIGH (ref 11.5–14.5)
RETICS #: 75.4 K/UL — SIGNIFICANT CHANGE UP (ref 25–125)
RETICS/RBC NFR: 3 % — HIGH (ref 0.5–1.5)
SODIUM SERPL-SCNC: 134 MMOL/L — LOW (ref 135–146)
WBC # BLD: 2.76 K/UL — LOW (ref 4.8–10.8)
WBC # FLD AUTO: 2.76 K/UL — LOW (ref 4.8–10.8)

## 2025-06-14 PROCEDURE — 99223 1ST HOSP IP/OBS HIGH 75: CPT

## 2025-06-14 PROCEDURE — 93306 TTE W/DOPPLER COMPLETE: CPT | Mod: 26

## 2025-06-14 PROCEDURE — 74177 CT ABD & PELVIS W/CONTRAST: CPT | Mod: 26

## 2025-06-14 PROCEDURE — 71260 CT THORAX DX C+: CPT | Mod: 26

## 2025-06-14 PROCEDURE — 99497 ADVNCD CARE PLAN 30 MIN: CPT | Mod: 25

## 2025-06-14 PROCEDURE — 99233 SBSQ HOSP IP/OBS HIGH 50: CPT

## 2025-06-14 RX ORDER — METOPROLOL SUCCINATE 50 MG/1
50 TABLET, EXTENDED RELEASE ORAL EVERY 12 HOURS
Refills: 0 | Status: DISCONTINUED | OUTPATIENT
Start: 2025-06-14 | End: 2025-06-16

## 2025-06-14 RX ORDER — INSULIN LISPRO 100 U/ML
INJECTION, SOLUTION INTRAVENOUS; SUBCUTANEOUS
Refills: 0 | Status: DISCONTINUED | OUTPATIENT
Start: 2025-06-14 | End: 2025-06-16

## 2025-06-14 RX ORDER — SODIUM CHLORIDE 9 G/1000ML
1000 INJECTION, SOLUTION INTRAVENOUS
Refills: 0 | Status: DISCONTINUED | OUTPATIENT
Start: 2025-06-14 | End: 2025-06-16

## 2025-06-14 RX ORDER — DEXTROSE 50 % IN WATER 50 %
25 SYRINGE (ML) INTRAVENOUS ONCE
Refills: 0 | Status: DISCONTINUED | OUTPATIENT
Start: 2025-06-14 | End: 2025-06-16

## 2025-06-14 RX ORDER — GLUCAGON 3 MG/1
1 POWDER NASAL ONCE
Refills: 0 | Status: DISCONTINUED | OUTPATIENT
Start: 2025-06-14 | End: 2025-06-16

## 2025-06-14 RX ORDER — DEXTROSE 50 % IN WATER 50 %
15 SYRINGE (ML) INTRAVENOUS ONCE
Refills: 0 | Status: DISCONTINUED | OUTPATIENT
Start: 2025-06-14 | End: 2025-06-16

## 2025-06-14 RX ORDER — INSULIN GLARGINE-YFGN 100 [IU]/ML
13 INJECTION, SOLUTION SUBCUTANEOUS AT BEDTIME
Refills: 0 | Status: DISCONTINUED | OUTPATIENT
Start: 2025-06-14 | End: 2025-06-16

## 2025-06-14 RX ORDER — ASPIRIN 325 MG
81 TABLET ORAL DAILY
Refills: 0 | Status: DISCONTINUED | OUTPATIENT
Start: 2025-06-14 | End: 2025-06-16

## 2025-06-14 RX ORDER — ATORVASTATIN CALCIUM 80 MG/1
80 TABLET, FILM COATED ORAL AT BEDTIME
Refills: 0 | Status: DISCONTINUED | OUTPATIENT
Start: 2025-06-14 | End: 2025-06-16

## 2025-06-14 RX ORDER — DEXTROSE 50 % IN WATER 50 %
12.5 SYRINGE (ML) INTRAVENOUS ONCE
Refills: 0 | Status: DISCONTINUED | OUTPATIENT
Start: 2025-06-14 | End: 2025-06-16

## 2025-06-14 RX ADMIN — Medication 40 MILLIGRAM(S): at 17:33

## 2025-06-14 RX ADMIN — INSULIN LISPRO 3: 100 INJECTION, SOLUTION INTRAVENOUS; SUBCUTANEOUS at 11:41

## 2025-06-14 RX ADMIN — INSULIN LISPRO 1: 100 INJECTION, SOLUTION INTRAVENOUS; SUBCUTANEOUS at 17:35

## 2025-06-14 RX ADMIN — METOPROLOL SUCCINATE 50 MILLIGRAM(S): 50 TABLET, EXTENDED RELEASE ORAL at 17:33

## 2025-06-14 RX ADMIN — Medication 81 MILLIGRAM(S): at 11:40

## 2025-06-14 RX ADMIN — Medication 40 MILLIGRAM(S): at 06:21

## 2025-06-14 RX ADMIN — METOPROLOL SUCCINATE 50 MILLIGRAM(S): 50 TABLET, EXTENDED RELEASE ORAL at 06:21

## 2025-06-14 NOTE — CONSULT NOTE ADULT - ASSESSMENT
65 yo M with hx of diverticulitis/colovesicular fistula s/p sigmoid bowel ressection w/ anastomosis, CAD s/p PCI on ASA/brilinta, HTN, HLD, DM who presents with sob, malaise, fatigue, tingling in hands, BLE swelling x3 wks. Says he gets sob walking <10 ft which si not normal for him. Noticed some dark stools. Went to PMD ( DR Florentino) for routine blood work and was called that he needed a blood transfusion so advised to go to ED. Hg noted to be 5.3 (last 14 in 2022). He was given 2 units PRBC and responded appropriately. Labs also revealing pancytopenia. HD stable. Patient endorse 60lb weight loss in the past year. Also endorses new onset diabetes. Endorses some intermittent dysphagia, sometimes it feels like food gets stuck but ultimately goes down. HD stable. ASHVIN shows light brown stool no melena or hematochezia. No fam Hx GI malignancy, mother had breast CA. Ex smoker, quit 20 years ago.     #Pancytopenia / unintentional weight loss / new onset DM - r/o malignancy, do not suspect GI bleed at this time   ~60lb weight loss in last year  diagnosed with DM in the past year  States may have had some intermittent dark colored stools over the past 2 months  Ex smoker  Last colonoscopy 7 years ago s/p bowel resection, reports unremarkable   Never had EGD  No fam Hx GI malignancy, endorses BC in mother  Hb 5.3->7.2 s/p 2 unit PRBC  ASHVIN shows light brown stool, negative for blood or melena     Rec  Please obtain CT CAP w/ IV con and pancreatic protocol   Patient would benefit from EGD/colonoscopy as outpatient. Can set up via [facility]. (Patient wants to attend son's graduation on Wednesday)  Diet as tolerated   Monitor for overt bleeding or significant drop in hemoglobin  Hematology eval  Would need to hold Brilinta for 5 days prior to procedure     65 yo M with hx of diverticulitis/colovesicular fistula s/p sigmoid bowel ressection w/ anastomosis, CAD s/p PCI on ASA/brilinta, HTN, HLD, DM who presents with sob, malaise, fatigue, tingling in hands, BLE swelling x3 wks. Says he gets sob walking <10 ft which si not normal for him. Noticed some dark stools. Went to PMD ( DR Florentino) for routine blood work and was called that he needed a blood transfusion so advised to go to ED. Hg noted to be 5.3 (last 14 in 2022). He was given 2 units PRBC and responded appropriately. Labs also revealing pancytopenia. HD stable. Patient endorse 60lb weight loss in the past year. Also endorses new onset diabetes. Endorses some intermittent dysphagia, sometimes it feels like food gets stuck but ultimately goes down. HD stable. ASHVIN shows light brown stool no melena or hematochezia. No fam Hx GI malignancy, mother had breast CA. Ex smoker, quit 20 years ago.     #Pancytopenia / unintentional weight loss / new onset DM - r/o malignancy, do not suspect GI bleed at this time   ~60lb weight loss in last year  diagnosed with DM in the past year  States may have had some intermittent dark colored stools over the past 2 months  Ex smoker  Last colonoscopy 7 years ago s/p bowel resection, reports unremarkable   Never had EGD  No fam Hx GI malignancy, endorses BC in mother  Hb 5.3->7.2 s/p 2 unit PRBC  ASHVIN shows light brown stool, negative for blood or melena     Rec  Please obtain CT CAP w/ IV con and pancreatic protocol   Do not suspect GI bleeding, non LIZBETH. rec hematology eval in setting of pancytopenia   Send B12 folate  Patient would benefit from EGD/colonoscopy as outpatient. Can set up via [facility]. (Patient wants to attend son's graduation on Wednesday)  Diet as tolerated   Monitor for overt bleeding or significant drop in hemoglobin  Would need to hold Brilinta for 5 days prior to procedure     65 yo M with hx of diverticulitis/colovesicular fistula s/p sigmoid bowel ressection w/ anastomosis, CAD s/p PCI on ASA/brilinta, HTN, HLD, DM who presents with sob, malaise, fatigue, tingling in hands, BLE swelling x3 wks. Says he gets sob walking <10 ft which si not normal for him. Noticed some dark stools. Went to PMD ( DR Florentino) for routine blood work and was called that he needed a blood transfusion so advised to go to ED. Hg noted to be 5.3 (last 14 in 2022). He was given 2 units PRBC and responded appropriately. Labs also revealing pancytopenia. HD stable. Patient endorse 60lb weight loss in the past year. Also endorses new onset diabetes. Endorses some intermittent dysphagia, sometimes it feels like food gets stuck but ultimately goes down. HD stable. ASHVIN shows light brown stool no melena or hematochezia. No fam Hx GI malignancy, mother had breast CA. Ex smoker, quit 20 years ago.     #Pancytopenia / unintentional weight loss / new onset DM - r/o malignancy, do not suspect GI bleed at this time   ~60lb weight loss in last year  diagnosed with DM in the past year  States may have had some intermittent dark colored stools over the past 2 months  Ex smoker  Last colonoscopy 7 years ago s/p bowel resection, reports unremarkable   Never had EGD  No fam Hx GI malignancy, endorses BC in mother  Hb 5.3->7.2 s/p 2 unit PRBC  ASHVIN shows light brown stool, negative for blood or melena     Rec  Please obtain CT CAP w/ IV con and pancreatic protocol   Do not suspect GI bleeding, non LIZBETH. Rec  hematology eval in setting of pancytopenia   Send B12 folate  Patient would benefit from EGD/colonoscopy as outpatient. Can set up via [facility]. (Patient wants to attend son's graduation on Wednesday)  Diet as tolerated   Monitor for overt bleeding or significant drop in hemoglobin  Would need to hold Brilinta for 5 days prior to procedure if no contraindication

## 2025-06-14 NOTE — CONSULT NOTE ADULT - TIME BILLING
Reviewed all pertinent clinical information and reviewed all relevant imaging and diagnostic studies.  Counseled the patient about diagnostic testing and treatment plan. All questions were answered.  Discussed recommendations with the primary team and coordinated care. Time spent on this encounter excludes teaching time and separately reported services.

## 2025-06-14 NOTE — PROGRESS NOTE ADULT - ASSESSMENT
63 yo M with hx of CAD s/p 3 PCI on 2022 on ASA/brilinta, HTN, HLD, DM, hx of diverticulitis with fistula s/p partial colon resection,  who presents with sob, malaise, fatigue, tingling in hands, BLE swelling. Says he gets dyepneic walking <10 ft for weeks which si not normal for him. Noticed some dark tarry stools since 2d ago. Went to PMD ( DR Florentino) today for routine blood work and was called that he needed a blood transfusion so advised to go to ED. No fever, cp, abd pain, hematochezia, calf swelling/pain, hx of clots, hormone use, recent immobilization/surg, malignancy, hemoptysis. No hx of GIB or blood transfusion. Says last colonoscopy was 7 yrs ago when he had a fistula requiring partial colon resection. Per [facility], last hgb 14.1 on 3/7/22.    #melena - resolved   #pancytopenia   #unintentional weight loss   #new onset DM   #Hx of diverticulitis s/p partial colon resection  7y ago  - on admission Hb 5.3 > 7.2   - presents with sob, malaise, fatigue, tingling in hands, LE swelling, & dyspnea x3 wks   - lost ~60lb weight loss in last year  - had tarry black stools, but last BM was brown   - in the ED: pt HD stable, tachycardic  - ASHVIN: light brown stool, negative for blood or melena   - s/p 2 units prbc   - c/w PPI IV bid  - per GI: obtain CT CAP w/ IV con and pancreatic protocol, EGD/colonoscopy as outpatient, hold Brilinta for 5 days prior to procedure   - diet as tolerated   - monitor H&H   - heme/onc consulted     #H/o NSTEMI in January 2022   #Hx of HTN & HLD  -s/p 3 PCIs  -takes aspirin, brilinta  -will hold brilinta for now    #DM  -takes metformin & jardiance  -start insulin & adjust as needed    GI ppx: PPI IV bid  DVT ppx: hold for now  Diet: regular   Code: full code   Pending: CT A/P/Chest, heme/onc f/u    65 yo M with hx of CAD s/p 3 PCI on 2022 on ASA/brilinta, HTN, HLD, DM, hx of diverticulitis with fistula s/p partial colon resection,  who presents with sob, malaise, fatigue, tingling in hands, BLE swelling. Says he gets dyepneic walking <10 ft for weeks which si not normal for him. Noticed some dark tarry stools since 2d ago. Went to PMD ( DR Florentino) today for routine blood work and was called that he needed a blood transfusion so advised to go to ED. No fever, cp, abd pain, hematochezia, calf swelling/pain, hx of clots, hormone use, recent immobilization/surg, malignancy, hemoptysis. No hx of GIB or blood transfusion. Says last colonoscopy was 7 yrs ago when he had a fistula requiring partial colon resection. Per [facility]OB[facility], last hgb 14.1 on 3/7/22.    #melena - resolved   #pancytopenia   #unintentional weight loss   #new onset DM   #Hx of diverticulitis s/p partial colon resection  7y ago  - on admission Hb 5.3 > 7.7   - presents with sob, malaise, fatigue, tingling in hands, LE swelling, & dyspnea x3 wks   - lost ~60lb weight loss in last year  - had tarry black stools, but last BM was brown   - in the ED: pt HD stable, tachycardic  - ASHVIN: light brown stool, negative for blood or melena   - s/p 2 units prbc   - will give one more unit today to keep hb > 8   - c/w PPI IV bid  - per GI: obtain CT CAP w/ IV con and pancreatic protocol, EGD/colonoscopy as outpatient, hold Brilinta for 5 days prior to procedure   - diet as tolerated   - monitor H&H   - heme/onc consulted     #H/o NSTEMI in January 2022   #Hx of HTN & HLD  -s/p 3 PCIs  -takes aspirin, brilinta  -will hold brilinta for now    #DM  -takes metformin & jardiance  -start insulin & adjust as needed    GI ppx: PPI IV bid  DVT ppx: hold for now  Diet: regular   Code: full code   Pending: CT A/P/Chest, heme/onc f/u   63 yo M with hx of CAD s/p 3 PCI on 2022 on ASA/brilinta, HTN, HLD, DM, hx of diverticulitis with fistula s/p partial colon resection,  who presents with sob, malaise, fatigue, tingling in hands, BLE swelling. Says he gets dyepneic walking <10 ft for weeks which si not normal for him. Noticed some dark tarry stools since 2d ago. Went to PMD ( DR Florentino) today for routine blood work and was called that he needed a blood transfusion so advised to go to ED. No fever, cp, abd pain, hematochezia, calf swelling/pain, hx of clots, hormone use, recent immobilization/surg, malignancy, hemoptysis. No hx of GIB or blood transfusion. Says last colonoscopy was 7 yrs ago when he had a fistula requiring partial colon resection. Per YOB[facility], last hgb 14.1 on 3/7/22.    #melena - resolved s/p 2 units will need another   #pancytopenia   #macrocytic anemia   #unintentional weight loss   #new onset DM   #Hx of diverticulitis s/p partial colon resection  7y ago  - on admission Hb 5.3 > 7.7   - presents with sob, malaise, fatigue, tingling in hands, LE swelling, & dyspnea x3 wks   - lost ~60lb weight loss in last year  - had tarry black stools, but last BM was brown   - in the ED: pt HD stable, tachycardic  - ASHVIN: light brown stool, negative for blood or melena   - s/p 2 units prbc --> will get another today as hgb 7.7, pt has CAD hx   - will give one more unit today to keep hb > 8   - c/w PPI IV bid  - per GI: obtain CT CAP w/ IV con and pancreatic protocol, EGD/colonoscopy as outpatient, hold Brilinta for 5 days prior to procedure   - diet as tolerated   - monitor H&H   - heme/onc consulted   - check b12, folate, hepatitis     #H/o NSTEMI in January 2022   #Hx of HTN & HLD  -s/p 3 PCIs  -takes aspirin, brilinta  -will hold brilinta for now    #DM  -takes metformin & jardiance  -start insulin & adjust as needed    GI ppx: PPI IV bid  DVT ppx: hold for now  Diet: regular   Code: full code   Pending: CT A/P/Chest, heme/onc f/u

## 2025-06-14 NOTE — CONSULT NOTE ADULT - SUBJECTIVE AND OBJECTIVE BOX
Gastroenterology Consultation:    Patient is a 64y old  Male who presents with a chief complaint of melena, anemia (13 Jun 2025 23:41)        Admitted on: 06-13-25      HPI:  65 yo M with hx of diverticulitis/colovesicular fistula s/p sigmoid bowel ressection w/ anastomosis, CAD s/p PCI on ASA/brilinta, HTN, HLD, DM who presents with sob, malaise, fatigue, tingling in hands, BLE swelling x3 wks. Says he gets sob walking <10 ft which si not normal for him. Noticed some dark stools. Went to PMD ( DR Florentino) for routine blood work and was called that he needed a blood transfusion so advised to go to ED. Hg noted to be 5.3 (last 14 in 2022). He was given 2 units PRBC and responded appropriately. Labs also revealing pancytopenia. HD stable. Patient endorse 60lb weight loss in the past year. Also endorses new onset diabetes. Endorses some intermittent dysphagia, sometimes it feels like food gets stuck but ultimately goes down. HD stable. ASHVIN shows light brown stool no melena or hematochezia. No fam Hx GI malignancy, mother had breast CA. Ex smoker, quit 20 years ago.     Prior EGD:  none in chart  Prior Colonoscopy:  none in chart    PAST MEDICAL & SURGICAL HISTORY:  Arthritis      HTN (hypertension)      HLD (hyperlipidemia)      CAD (coronary artery disease)      DM (diabetes mellitus)      History of colon resection      History of coronary artery stent placement            FAMILY HISTORY:  FH: CAD (coronary artery disease) (Father)        Social History:  Tobacco:  Alcohol:  Drugs:    Home Medications:  empagliflozin 25 mg oral tablet: 1 tab(s) orally once a day (14 Jun 2025 00:51)  MetFORMIN (Eqv-Fortamet) 500 mg oral tablet, extended release: 1 tab(s) orally 2 times a day (14 Jun 2025 00:51)  ticagrelor 60 mg oral tablet: 1 tab(s) orally 2 times a day (14 Jun 2025 00:49)        MEDICATIONS  (STANDING):  aspirin  chewable 81 milliGRAM(s) Oral daily  atorvastatin 80 milliGRAM(s) Oral at bedtime  dextrose 5%. 1000 milliLiter(s) (50 mL/Hr) IV Continuous <Continuous>  dextrose 5%. 1000 milliLiter(s) (100 mL/Hr) IV Continuous <Continuous>  dextrose 50% Injectable 25 Gram(s) IV Push once  dextrose 50% Injectable 12.5 Gram(s) IV Push once  dextrose 50% Injectable 25 Gram(s) IV Push once  glucagon  Injectable 1 milliGRAM(s) IntraMuscular once  insulin glargine Injectable (LANTUS) 13 Unit(s) SubCutaneous at bedtime  insulin lispro (ADMELOG) corrective regimen sliding scale   SubCutaneous three times a day before meals  metoprolol tartrate 50 milliGRAM(s) Oral every 12 hours  pantoprazole  Injectable 80 milliGRAM(s) IV Push once  pantoprazole  Injectable 40 milliGRAM(s) IV Push every 12 hours    MEDICATIONS  (PRN):  dextrose Oral Gel 15 Gram(s) Oral once PRN Blood Glucose LESS THAN 70 milliGRAM(s)/deciliter      Allergies  shellfish (Anaphylaxis)  No Known Drug Allergies      Review of Systems:   Constitutional:  No Fever, No Chills  ENT/Mouth:  No Hearing Changes,  No Difficulty Swallowing  Eyes:  No Eye Pain, No Vision Changes  Cardiovascular:  No Chest Pain, No Palpitations  Respiratory:  No Cough, No Dyspnea  Gastrointestinal:  As described in HPI  Musculoskeletal:  No Joint Swelling, No Back Pain  Skin:  No Skin Lesions, No Jaundice  Neuro:  No Syncope, No Dizziness  Heme/Lymph:  No Bruising, No Bleeding.          Physical Examination:  T(C): 36.6 (06-14-25 @ 07:37), Max: 37.2 (06-14-25 @ 02:00)  HR: 95 (06-14-25 @ 07:37) (61 - 110)  BP: 115/82 (06-14-25 @ 07:37) (100/59 - 123/90)  RR: 18 (06-14-25 @ 07:37) (18 - 20)  SpO2: 97% (06-14-25 @ 07:37) (97% - 98%)  Height (cm): 167.6 (06-13-25 @ 19:34)  Weight (kg): 65.8 (06-13-25 @ 19:34)        GENERAL: AAOx3, no acute distress.  HEAD:  Atraumatic, Normocephalic  EYES: conjunctiva and sclera clear  NECK: Supple, no JVD or thyromegaly  CHEST/LUNG: Clear to auscultation bilaterally; No wheeze, rhonchi, or rales  HEART: Regular rate and rhythm; normal S1, S2, No murmurs.  ABDOMEN: Soft, nontender, nondistended; Bowel sounds present  NEUROLOGY: No asterixis or tremor.   SKIN: Intact, no jaundice        Data:                        7.7    2.76  )-----------( 75       ( 14 Jun 2025 06:28 )             23.5     Hgb Trend:  7.7  06-14-25 @ 06:28  5.3  06-13-25 @ 21:49      06-14    134[L]  |  106  |  21[H]  ----------------------------<  154[H]  4.3   |  19  |  1.1    Ca    8.3[L]      14 Jun 2025 06:28    TPro  6.4  /  Alb  3.5  /  TBili  1.0  /  DBili  x   /  AST  8   /  ALT  8   /  AlkPhos  124[H]  06-14    Liver panel trend:  TBili 1.0   /   AST 8   /   ALT 8   /   AlkP 124   /   Tptn 6.4   /   Alb 3.5    /   DBili --      06-14  TBili 0.8   /   AST 10   /   ALT 10   /   AlkP 146   /   Tptn 6.6   /   Alb 3.7    /   DBili --      06-13      PT/INR - ( 13 Jun 2025 21:49 )   PT: 14.20 sec;   INR: 1.20 ratio         PTT - ( 13 Jun 2025 21:49 )  PTT:25.9 sec        Radiology:       Gastroenterology Consultation:    Patient is a 64y old  Male who presents with a chief complaint of  anemia (13 Jun 2025 23:41)        Admitted on: 06-13-25      HPI:  63 yo M with hx of diverticulitis/ colovesicular fistula s/p sigmoid bowel resection w/ anastomosis, CAD s/p PCI on ASA/brilinta, HTN, HLD, DM who presents with sob, malaise, fatigue, tingling in hands, BLE swelling x3 wks. Says he gets sob walking <10 ft which si not normal for him. Noticed some dark stools. Went to PMD ( DR Florentino) for routine blood work and was called that he needed a blood transfusion so advised to go to ED. Hg noted to be 5.3 (last 14 in 2022). He was given 2 units PRBC and responded appropriately. Labs also revealing pancytopenia. HD stable. Patient endorse 60lb weight loss in the past year. Also endorses new onset diabetes. Endorses some intermittent dysphagia, sometimes it feels like food gets stuck but ultimately goes down. HD stable. ASHVIN shows light brown stool no melena or hematochezia. No fam Hx GI malignancy, mother had breast CA. Ex smoker, quit 20 years ago.     Prior EGD:  none in chart  Prior Colonoscopy:  none in chart    PAST MEDICAL & SURGICAL HISTORY:  Arthritis      HTN (hypertension)      HLD (hyperlipidemia)      CAD (coronary artery disease)      DM (diabetes mellitus)      History of colon resection      History of coronary artery stent placement            FAMILY HISTORY:  FH: CAD (coronary artery disease) (Father)        Social History:  Tobacco:  Alcohol:  Drugs:    Home Medications:  empagliflozin 25 mg oral tablet: 1 tab(s) orally once a day (14 Jun 2025 00:51)  MetFORMIN (Eqv-Fortamet) 500 mg oral tablet, extended release: 1 tab(s) orally 2 times a day (14 Jun 2025 00:51)  ticagrelor 60 mg oral tablet: 1 tab(s) orally 2 times a day (14 Jun 2025 00:49)        MEDICATIONS  (STANDING):  aspirin  chewable 81 milliGRAM(s) Oral daily  atorvastatin 80 milliGRAM(s) Oral at bedtime  dextrose 5%. 1000 milliLiter(s) (50 mL/Hr) IV Continuous <Continuous>  dextrose 5%. 1000 milliLiter(s) (100 mL/Hr) IV Continuous <Continuous>  dextrose 50% Injectable 25 Gram(s) IV Push once  dextrose 50% Injectable 12.5 Gram(s) IV Push once  dextrose 50% Injectable 25 Gram(s) IV Push once  glucagon  Injectable 1 milliGRAM(s) IntraMuscular once  insulin glargine Injectable (LANTUS) 13 Unit(s) SubCutaneous at bedtime  insulin lispro (ADMELOG) corrective regimen sliding scale   SubCutaneous three times a day before meals  metoprolol tartrate 50 milliGRAM(s) Oral every 12 hours  pantoprazole  Injectable 80 milliGRAM(s) IV Push once  pantoprazole  Injectable 40 milliGRAM(s) IV Push every 12 hours    MEDICATIONS  (PRN):  dextrose Oral Gel 15 Gram(s) Oral once PRN Blood Glucose LESS THAN 70 milliGRAM(s)/deciliter      Allergies  shellfish (Anaphylaxis)  No Known Drug Allergies      Review of Systems:   Constitutional:  No Fever, No Chills  ENT/Mouth:  No Hearing Changes,  No Difficulty Swallowing  Eyes:  No Eye Pain, No Vision Changes  Cardiovascular:  No Chest Pain, No Palpitations  Respiratory:  No Cough, No Dyspnea  Gastrointestinal:  As described in HPI  Musculoskeletal:  No Joint Swelling, No Back Pain  Skin:  No Skin Lesions, No Jaundice  Neuro:  No Syncope, No Dizziness  Heme/Lymph:  No Bruising, No Bleeding.          Physical Examination:  T(C): 36.6 (06-14-25 @ 07:37), Max: 37.2 (06-14-25 @ 02:00)  HR: 95 (06-14-25 @ 07:37) (61 - 110)  BP: 115/82 (06-14-25 @ 07:37) (100/59 - 123/90)  RR: 18 (06-14-25 @ 07:37) (18 - 20)  SpO2: 97% (06-14-25 @ 07:37) (97% - 98%)  Height (cm): 167.6 (06-13-25 @ 19:34)  Weight (kg): 65.8 (06-13-25 @ 19:34)        GENERAL: AAOx3, no acute distress.  HEAD:  Atraumatic, Normocephalic  EYES: conjunctiva and sclera clear  NECK: Supple, no JVD or thyromegaly  CHEST/LUNG: Clear to auscultation bilaterally; No wheeze, rhonchi, or rales  HEART: Regular rate and rhythm; normal S1, S2, No murmurs.  ABDOMEN: Soft, nontender, nondistended; Bowel sounds present  NEUROLOGY: No asterixis or tremor.   SKIN: Intact, no jaundice        Data:                        7.7    2.76  )-----------( 75       ( 14 Jun 2025 06:28 )             23.5     Hgb Trend:  7.7  06-14-25 @ 06:28  5.3  06-13-25 @ 21:49      06-14    134[L]  |  106  |  21[H]  ----------------------------<  154[H]  4.3   |  19  |  1.1    Ca    8.3[L]      14 Jun 2025 06:28    TPro  6.4  /  Alb  3.5  /  TBili  1.0  /  DBili  x   /  AST  8   /  ALT  8   /  AlkPhos  124[H]  06-14    Liver panel trend:  TBili 1.0   /   AST 8   /   ALT 8   /   AlkP 124   /   Tptn 6.4   /   Alb 3.5    /   DBili --      06-14  TBili 0.8   /   AST 10   /   ALT 10   /   AlkP 146   /   Tptn 6.6   /   Alb 3.7    /   DBili --      06-13      PT/INR - ( 13 Jun 2025 21:49 )   PT: 14.20 sec;   INR: 1.20 ratio         PTT - ( 13 Jun 2025 21:49 )  PTT:25.9 sec        Radiology:

## 2025-06-14 NOTE — PROGRESS NOTE ADULT - ATTENDING COMMENTS
Interval history: Pt seen and examined at bedside. No cp or sob.   Vital Signs (24 Hrs):  T(C): 36.6 (06-14-25 @ 07:37), Max: 37.2 (06-14-25 @ 02:00)  HR: 95 (06-14-25 @ 07:37) (61 - 110)  BP: 115/82 (06-14-25 @ 07:37) (100/59 - 123/90)  RR: 18 (06-14-25 @ 07:37) (18 - 20)  SpO2: 97% (06-14-25 @ 07:37) (97% - 98%)  Wt(kg): --  Daily Height in cm: 167.64 (13 Jun 2025 19:34)    I&O's Summary    PHYSICAL EXAM:  GENERAL: NAD, well-developed  HEAD:  Atraumatic, Normocephalic  EYES: EOMI, PERRLA, conjunctiva and sclera clear  NECK: Supple, No JVD  CHEST/LUNG: Clear to auscultation bilaterally; No wheeze  HEART: Regular rate and rhythm; No murmurs, rubs, or gallops  ABDOMEN: Soft, Nontender, Nondistended; Bowel sounds present  EXTREMITIES:  2+ Peripheral Pulses, No clubbing, cyanosis, or edema, tatoos  PSYCH: AAOx3  NEUROLOGY: non-focal  SKIN: No rashes or lesions  Labs reviewed    Plan as above. DW resident. Agree to plan. Made edits    #Progress Note Handoff  Pending (specify):  GI reccs, hemeonc consult   Family discussion: house staff updated pt family  Disposition: home when cleared   Decision to admit the pt is based on acuity as above

## 2025-06-14 NOTE — PATIENT PROFILE ADULT - FALL HARM RISK - HARM RISK INTERVENTIONS

## 2025-06-15 LAB
ANION GAP SERPL CALC-SCNC: 10 MMOL/L — SIGNIFICANT CHANGE UP (ref 7–14)
BASOPHILS # BLD AUTO: 0.02 K/UL — SIGNIFICANT CHANGE UP (ref 0–0.2)
BASOPHILS # BLD AUTO: 0.02 K/UL — SIGNIFICANT CHANGE UP (ref 0–0.2)
BASOPHILS NFR BLD AUTO: 0.7 % — SIGNIFICANT CHANGE UP (ref 0–1)
BASOPHILS NFR BLD AUTO: 0.7 % — SIGNIFICANT CHANGE UP (ref 0–1)
BUN SERPL-MCNC: 19 MG/DL — SIGNIFICANT CHANGE UP (ref 10–20)
CALCIUM SERPL-MCNC: 8.4 MG/DL — SIGNIFICANT CHANGE UP (ref 8.4–10.5)
CHLORIDE SERPL-SCNC: 101 MMOL/L — SIGNIFICANT CHANGE UP (ref 98–110)
CO2 SERPL-SCNC: 21 MMOL/L — SIGNIFICANT CHANGE UP (ref 17–32)
CREAT SERPL-MCNC: 1.2 MG/DL — SIGNIFICANT CHANGE UP (ref 0.7–1.5)
EGFR: 68 ML/MIN/1.73M2 — SIGNIFICANT CHANGE UP
EGFR: 68 ML/MIN/1.73M2 — SIGNIFICANT CHANGE UP
EOSINOPHIL # BLD AUTO: 0.06 K/UL — SIGNIFICANT CHANGE UP (ref 0–0.7)
EOSINOPHIL # BLD AUTO: 0.07 K/UL — SIGNIFICANT CHANGE UP (ref 0–0.7)
EOSINOPHIL NFR BLD AUTO: 2.2 % — SIGNIFICANT CHANGE UP (ref 0–8)
EOSINOPHIL NFR BLD AUTO: 2.5 % — SIGNIFICANT CHANGE UP (ref 0–8)
FERRITIN SERPL-MCNC: 1485 NG/ML — HIGH (ref 30–400)
FOLATE SERPL-MCNC: 12.1 NG/ML — SIGNIFICANT CHANGE UP
GLUCOSE BLDC GLUCOMTR-MCNC: 200 MG/DL — HIGH (ref 70–99)
GLUCOSE BLDC GLUCOMTR-MCNC: 228 MG/DL — HIGH (ref 70–99)
GLUCOSE BLDC GLUCOMTR-MCNC: 241 MG/DL — HIGH (ref 70–99)
GLUCOSE BLDC GLUCOMTR-MCNC: 278 MG/DL — HIGH (ref 70–99)
GLUCOSE SERPL-MCNC: 162 MG/DL — HIGH (ref 70–99)
HAPTOGLOB SERPL-MCNC: 131 MG/DL — SIGNIFICANT CHANGE UP (ref 34–200)
HAV IGM SER-ACNC: SIGNIFICANT CHANGE UP
HBV CORE IGM SER-ACNC: SIGNIFICANT CHANGE UP
HBV SURFACE AG SER-ACNC: SIGNIFICANT CHANGE UP
HCT VFR BLD CALC: 28.2 % — LOW (ref 42–52)
HCT VFR BLD CALC: 30.9 % — LOW (ref 42–52)
HCV AB S/CO SERPL IA: 0.13 S/CO — SIGNIFICANT CHANGE UP (ref 0–0.79)
HCV AB SERPL-IMP: SIGNIFICANT CHANGE UP
HGB BLD-MCNC: 10.3 G/DL — LOW (ref 14–18)
HGB BLD-MCNC: 9.3 G/DL — LOW (ref 14–18)
IMM GRANULOCYTES NFR BLD AUTO: 0.7 % — HIGH (ref 0.1–0.3)
IMM GRANULOCYTES NFR BLD AUTO: 1.1 % — HIGH (ref 0.1–0.3)
LYMPHOCYTES # BLD AUTO: 1.24 K/UL — SIGNIFICANT CHANGE UP (ref 1.2–3.4)
LYMPHOCYTES # BLD AUTO: 1.37 K/UL — SIGNIFICANT CHANGE UP (ref 1.2–3.4)
LYMPHOCYTES # BLD AUTO: 44 % — SIGNIFICANT CHANGE UP (ref 20.5–51.1)
LYMPHOCYTES # BLD AUTO: 50.7 % — SIGNIFICANT CHANGE UP (ref 20.5–51.1)
MAGNESIUM SERPL-MCNC: 2 MG/DL — SIGNIFICANT CHANGE UP (ref 1.8–2.4)
MCHC RBC-ENTMCNC: 30.6 PG — SIGNIFICANT CHANGE UP (ref 27–31)
MCHC RBC-ENTMCNC: 31 PG — SIGNIFICANT CHANGE UP (ref 27–31)
MCHC RBC-ENTMCNC: 33 G/DL — SIGNIFICANT CHANGE UP (ref 32–37)
MCHC RBC-ENTMCNC: 33.3 G/DL — SIGNIFICANT CHANGE UP (ref 32–37)
MCV RBC AUTO: 92.8 FL — SIGNIFICANT CHANGE UP (ref 80–94)
MCV RBC AUTO: 93.1 FL — SIGNIFICANT CHANGE UP (ref 80–94)
MONOCYTES # BLD AUTO: 0.35 K/UL — SIGNIFICANT CHANGE UP (ref 0.1–0.6)
MONOCYTES # BLD AUTO: 0.36 K/UL — SIGNIFICANT CHANGE UP (ref 0.1–0.6)
MONOCYTES NFR BLD AUTO: 12.4 % — HIGH (ref 1.7–9.3)
MONOCYTES NFR BLD AUTO: 13.3 % — HIGH (ref 1.7–9.3)
NEUTROPHILS # BLD AUTO: 0.87 K/UL — LOW (ref 1.4–6.5)
NEUTROPHILS # BLD AUTO: 1.11 K/UL — LOW (ref 1.4–6.5)
NEUTROPHILS NFR BLD AUTO: 32.4 % — LOW (ref 42.2–75.2)
NEUTROPHILS NFR BLD AUTO: 39.3 % — LOW (ref 42.2–75.2)
NRBC BLD AUTO-RTO: 0 /100 WBCS — SIGNIFICANT CHANGE UP (ref 0–0)
NRBC BLD AUTO-RTO: 1 /100 WBCS — HIGH (ref 0–0)
PLATELET # BLD AUTO: 75 K/UL — LOW (ref 130–400)
PMV BLD: 9.9 FL — SIGNIFICANT CHANGE UP (ref 7.4–10.4)
POTASSIUM SERPL-MCNC: 4.6 MMOL/L — SIGNIFICANT CHANGE UP (ref 3.5–5)
POTASSIUM SERPL-SCNC: 4.6 MMOL/L — SIGNIFICANT CHANGE UP (ref 3.5–5)
RBC # BLD: 3.04 M/UL — LOW (ref 4.7–6.1)
RBC # BLD: 3.32 M/UL — LOW (ref 4.7–6.1)
RBC # FLD: 18.2 % — HIGH (ref 11.5–14.5)
RBC # FLD: 18.3 % — HIGH (ref 11.5–14.5)
SODIUM SERPL-SCNC: 132 MMOL/L — LOW (ref 135–146)
VIT B12 SERPL-MCNC: 332 PG/ML — SIGNIFICANT CHANGE UP (ref 232–1245)
WBC # BLD: 2.7 K/UL — LOW (ref 4.8–10.8)
WBC # BLD: 2.82 K/UL — LOW (ref 4.8–10.8)
WBC # FLD AUTO: 2.7 K/UL — LOW (ref 4.8–10.8)
WBC # FLD AUTO: 2.82 K/UL — LOW (ref 4.8–10.8)

## 2025-06-15 PROCEDURE — 38222 DX BONE MARROW BX & ASPIR: CPT | Mod: RT

## 2025-06-15 PROCEDURE — 99233 SBSQ HOSP IP/OBS HIGH 50: CPT

## 2025-06-15 PROCEDURE — 88313 SPECIAL STAINS GROUP 2: CPT | Mod: 26

## 2025-06-15 PROCEDURE — 99223 1ST HOSP IP/OBS HIGH 75: CPT | Mod: 25

## 2025-06-15 PROCEDURE — 88341 IMHCHEM/IMCYTCHM EA ADD ANTB: CPT | Mod: 26

## 2025-06-15 PROCEDURE — 88342 IMHCHEM/IMCYTCHM 1ST ANTB: CPT | Mod: 26

## 2025-06-15 PROCEDURE — 88305 TISSUE EXAM BY PATHOLOGIST: CPT | Mod: 26

## 2025-06-15 PROCEDURE — 88311 DECALCIFY TISSUE: CPT | Mod: 26

## 2025-06-15 RX ORDER — TICAGRELOR 90 MG/1
60 TABLET ORAL EVERY 12 HOURS
Refills: 0 | Status: DISCONTINUED | OUTPATIENT
Start: 2025-06-16 | End: 2025-06-16

## 2025-06-15 RX ORDER — POLYETHYLENE GLYCOL-3350 AND ELECTROLYTES 236; 6.74; 5.86; 2.97; 22.74 G/274.31G; G/274.31G; G/274.31G; G/274.31G; G/274.31G
4000 POWDER, FOR SOLUTION ORAL
Qty: 1 | Refills: 0
Start: 2025-06-15 | End: 2025-06-15

## 2025-06-15 RX ORDER — BISACODYL 5 MG
4 TABLET, DELAYED RELEASE (ENTERIC COATED) ORAL
Qty: 4 | Refills: 0
Start: 2025-06-15 | End: 2025-06-15

## 2025-06-15 RX ADMIN — INSULIN LISPRO 1: 100 INJECTION, SOLUTION INTRAVENOUS; SUBCUTANEOUS at 08:12

## 2025-06-15 RX ADMIN — INSULIN GLARGINE-YFGN 13 UNIT(S): 100 INJECTION, SOLUTION SUBCUTANEOUS at 21:16

## 2025-06-15 RX ADMIN — ATORVASTATIN CALCIUM 80 MILLIGRAM(S): 80 TABLET, FILM COATED ORAL at 21:16

## 2025-06-15 RX ADMIN — INSULIN LISPRO 2: 100 INJECTION, SOLUTION INTRAVENOUS; SUBCUTANEOUS at 11:24

## 2025-06-15 RX ADMIN — METOPROLOL SUCCINATE 50 MILLIGRAM(S): 50 TABLET, EXTENDED RELEASE ORAL at 05:35

## 2025-06-15 RX ADMIN — Medication 81 MILLIGRAM(S): at 11:25

## 2025-06-15 RX ADMIN — INSULIN LISPRO 2: 100 INJECTION, SOLUTION INTRAVENOUS; SUBCUTANEOUS at 17:11

## 2025-06-15 RX ADMIN — METOPROLOL SUCCINATE 50 MILLIGRAM(S): 50 TABLET, EXTENDED RELEASE ORAL at 17:13

## 2025-06-15 NOTE — PROGRESS NOTE ADULT - ASSESSMENT
63 yo M with hx of CAD s/p 3 PCI on 2022 on ASA/brilinta, HTN, HLD, DM, hx of diverticulitis with fistula s/p partial colon resection,  who presents with sob, malaise, fatigue, tingling in hands, BLE swelling. Says he gets dyepneic walking <10 ft for weeks which si not normal for him. Noticed some dark tarry stools since 2d ago. Went to PMD ( DR Florentino) today for routine blood work and was called that he needed a blood transfusion so advised to go to ED. No fever, cp, abd pain, hematochezia, calf swelling/pain, hx of clots, hormone use, recent immobilization/surg, malignancy, hemoptysis. No hx of GIB or blood transfusion. Says last colonoscopy was 7 yrs ago when he had a fistula requiring partial colon resection. Per HIE, last hgb 14.1 on 3/7/22.    #melena - resolved s/p 2 units will need another   #pancytopenia  suspected Malignancy   #macrocytic anemia   #unintentional weight loss   #new onset DM   #Hx of diverticulitis s/p partial colon resection  7y ago  - on admission Hb 5.3 > 7.7   - presents with sob, malaise, fatigue, tingling in hands, LE swelling, & dyspnea x3 wks   - lost ~60lb weight loss in last year  - had tarry black stools, but last BM was brown   - in the ED: pt HD stable, tachycardic  - ASHVIN: light brown stool, negative for blood or melena   - s/p 2 units prbc --> will get another today as hgb 7.7, pt has CAD hx   - will give one more unit today to keep hb > 8   - dc PPI IV bid, ppi daily   - per GI: obtain EGD/colonoscopy as outpatient, hold Brilinta for 5 days prior to procedure ,[facility] outpt  - Pan scan neg   - diet as tolerated   - monitor H&H, H/h 2000  - heme/onc consulted, emma Mcdermott, will need BM bx   - wnl b12, folate, hepatitis panel   - follow up CBC if hgb drops again may need transfusion     #H/o NSTEMI in January 2022   #Hx of HTN & HLD  -s/p 3 PCIs  -takes aspirin, brilinta  -dw hemeonc, ok to restart brillinta, will restart on 6/16 follow up plt     #DM  -takes metformin & jardiance  - insulin & adjust as needed    #Progress Note Handoff  Pending (specify):  follow up hemonc,   Family discussion: house staff updated pt family  Disposition: home 6/16  Decision to admit the pt is based on acuity as above

## 2025-06-15 NOTE — CONSULT NOTE ADULT - ATTENDING COMMENTS
Presented to the hospital with pancytopenia. S/p bone marrow biopsy, dry tap. Will f/u biopsy result. Continue with support care to keep hb > 7.5 and platelets > 20k. Check DIC and TLS panel. Peripheral smear reviewed with fellow, no blast noted, reduced WBC and no platelets clumping. Follow up with cardiology regarding the necessity of Brilinta and aspirin, given the stent placed in 2022. The patient's thrombocytopenia along with DAPT put him at a high risk of bleeding.
Patient seen and examined during the GI rounds, case discussed with the GI fellow, plan communicated to the primary team, assessment, recommendations and plan as above.

## 2025-06-15 NOTE — CONSULT NOTE ADULT - ASSESSMENT
65 yo M with hx of diverticulitis/ colovesicular fistula s/p sigmoid bowel resection w/ anastomosis, CAD s/p PCI on ASA/brilinta,presents with sob, malaise, fatigue, tingling in hands, BLE swelling x3 wks, dark stools.       #Pancytopenia   Labs on admission WBC 3.3, Hb 5.3,MCV 97, Plt 115   Work up as above   No CT evidence of neoplastic process within chest, abdomen and pelvis.  Reports 60lbs weight loss in past year, dysphagia   Family Hx breast cancer in mother   Last colonoscopy 7 years ago s/p bowel resection, reports unremarkable   Never had EGD  S/p 3 units PRBC     Recommendations:   63 yo M with hx of diverticulitis/ colovesicular fistula s/p sigmoid bowel resection w/ anastomosis, CAD s/p PCI on ASA/brilinta,presents with sob, malaise, fatigue, tingling in hands, BLE swelling x3 wks, dark stools.       #Pancytopenia   Labs on admission WBC 3.3, Hb 5.3,MCV 97, Plt 115   Work up as above   No CT evidence of neoplastic process within chest, abdomen and pelvis.  Reports 60lbs weight loss in past year, dysphagia   Family Hx breast cancer in mother   Last colonoscopy 7 years ago s/p bowel resection, reports unremarkable   Never had EGD  S/p 3 units PRBC     Recommendations:  Work up so far for pancytopenia has been unremarkable. Concern for BM disorder MDS/AML.   Bedside BM biopsy done. Dry tap. BM core sent   Peripheral smear reviewed: few retic, no blasts noted. 1 flower cell noted   Send peripheral flow (2 lavender, 2 dark green tops) requisition form kept in patients chart   Check HTLV (flower cell noted)   Follow CBC in AM. if remains stable, can be discharged with OP follow with Dr Beebe to f/up pathology results   OP follow up with GI (c/o dysphagia)

## 2025-06-15 NOTE — PROGRESS NOTE ADULT - SUBJECTIVE AND OBJECTIVE BOX
Gastroenterology progress note:     Patient is a 64y old  Male who presents with a chief complaint of melena, anemia (15 Ramana 2025 07:40)       Admitted on: 06-13-25    We are following the patient for:   anemia    Interval History:  hb responded to blood transfusion  no overt bleeding   tolerating diet   ctap unremarkable     PAST MEDICAL & SURGICAL HISTORY:  Arthritis      HTN (hypertension)      HLD (hyperlipidemia)      CAD (coronary artery disease)      DM (diabetes mellitus)      History of colon resection      History of coronary artery stent placement          MEDICATIONS  (STANDING):  aspirin  chewable 81 milliGRAM(s) Oral daily  atorvastatin 80 milliGRAM(s) Oral at bedtime  dextrose 5%. 1000 milliLiter(s) (50 mL/Hr) IV Continuous <Continuous>  dextrose 5%. 1000 milliLiter(s) (100 mL/Hr) IV Continuous <Continuous>  dextrose 50% Injectable 25 Gram(s) IV Push once  dextrose 50% Injectable 12.5 Gram(s) IV Push once  dextrose 50% Injectable 25 Gram(s) IV Push once  glucagon  Injectable 1 milliGRAM(s) IntraMuscular once  insulin glargine Injectable (LANTUS) 13 Unit(s) SubCutaneous at bedtime  insulin lispro (ADMELOG) corrective regimen sliding scale   SubCutaneous three times a day before meals  metoprolol tartrate 50 milliGRAM(s) Oral every 12 hours  pantoprazole  Injectable 80 milliGRAM(s) IV Push once  pantoprazole  Injectable 40 milliGRAM(s) IV Push every 12 hours    MEDICATIONS  (PRN):  dextrose Oral Gel 15 Gram(s) Oral once PRN Blood Glucose LESS THAN 70 milliGRAM(s)/deciliter      Allergies  shellfish (Anaphylaxis)  No Known Drug Allergies      Review of Systems:   Cardiovascular:  No Chest Pain, No Palpitations  Respiratory:  No Cough, No Dyspnea  Gastrointestinal:  As described in HPI  Skin:  No Skin Lesions, No Jaundice  Neuro:  No Syncope, No Dizziness    Physical Examination:  T(C): 36.9 (06-15-25 @ 04:12), Max: 37 (06-14-25 @ 15:54)  HR: 76 (06-15-25 @ 04:12) (76 - 87)  BP: 103/65 (06-15-25 @ 04:12) (103/65 - 103/66)  RR: 18 (06-15-25 @ 04:12) (18 - 18)  SpO2: 98% (06-15-25 @ 04:12) (98% - 98%)        GENERAL: AAOx3, no acute distress.  HEAD:  Atraumatic, Normocephalic  EYES: conjunctiva and sclera clear  NECK: Supple, no JVD or thyromegaly  CHEST/LUNG: Clear to auscultation bilaterally; No wheeze, rhonchi, or rales  HEART: Regular rate and rhythm; normal S1, S2, No murmurs.  ABDOMEN: Soft, nontender, nondistended; Bowel sounds present  NEUROLOGY: No asterixis or tremor.   SKIN: Intact, no jaundice     Data:                        9.3    2.82  )-----------( 75       ( 15 Ramana 2025 07:21 )             28.2     Hgb trend:  9.3  06-15-25 @ 07:21  7.7  06-14-25 @ 06:28  5.3  06-13-25 @ 21:49      06-15    132[L]  |  101  |  19  ----------------------------<  162[H]  4.6   |  21  |  1.2    Ca    8.4      15 Ramana 2025 07:21  Mg     2.0     06-15    TPro  6.4  /  Alb  3.5  /  TBili  1.0  /  DBili  x   /  AST  8   /  ALT  8   /  AlkPhos  124[H]  06-14    Liver panel trend:  TBili 1.0   /   AST 8   /   ALT 8   /   AlkP 124   /   Tptn 6.4   /   Alb 3.5    /   DBili --      06-14  TBili 0.8   /   AST 10   /   ALT 10   /   AlkP 146   /   Tptn 6.6   /   Alb 3.7    /   DBili --      06-13      PT/INR - ( 13 Jun 2025 21:49 )   PT: 14.20 sec;   INR: 1.20 ratio         PTT - ( 13 Jun 2025 21:49 )  PTT:25.9 sec       Radiology:  CT Abdomen and Pelvis w/ IV Cont:   ACC: [id number] EXAM:  CT CHEST IC   ORDERED BY: MEET PACHECO     ACC: [id number] EXAM:  CT ABDOMEN AND PELVIS IC   ORDERED BY: MEET PACHECO     PROCEDURE DATE:  06/14/2025          INTERPRETATION:  CLINICAL HISTORY/REASON FOR EXAM: Rule out malignancy.   Anemia. Melena.    TECHNIQUE: Contiguous axial CT images were obtained from the thoracic   inlet to the pubic symphysis following administration of 100 cc Omnipaque   350 intravenous contrast.  Oral contrast was not administered.   Reformatted images in the coronal and sagittal planes were acquired. 3D   (MIP) images obtained.    COMPARISON: CT abdomen and pelvis 2/15/2019..      FINDINGS:    CHEST:    LUNGS, PLEURA, AIRWAYS: No lobar consolidation, mass, effusion, or   pneumothorax. No evidence of central endobronchial obstruction. No   bronchiectasis or honeycombing. No suspicious pulmonary nodule.    THORACIC NODES: No mediastinal, hilar, supraclavicular, or axillary   lymphadenopathy.    MEDIASTINUM/GREAT VESSELS: No pericardial effusion. Heart size is within   normal limits. The aorta and main pulmonary artery are of normal caliber.   Aortic and coronary artery calcifications    ABDOMEN/PELVIS:    HEPATOBILIARY: Unremarkable.    SPLEEN: Unremarkable.    PANCREAS: Unremarkable pancreas, without evidence of mass on CT.   Nondilated main pancreatic duct.    ADRENAL GLANDS: Unremarkable.    KIDNEYS: Symmetric pattern of renal enhancement. Mild renal fullness   without kimberli hydronephrosis bilaterally. Right renal cyst. Punctate   bilateral nonobstructing renal calculi.    ABDOMINOPELVIC NODES: No lymphadenopathy.    PELVIC ORGANS: Unremarkable.    PERITONEUM/MESENTERY/BOWEL: No bowel obstruction. No ascites or   pneumoperitoneum. Small hiatal hernia. Post partial sigmoid resection   with anastomosis noted. Normal appendix.    BONES/SOFT TISSUES: Degenerative changes. No suspicious bone lesion.    VASCULAR : Vascular calcifications.    OTHER: Bilateral small inguinal hernias, containing nonobstructed bowel   on right.      IMPRESSION:  No CT evidence of neoplastic process within chest, abdomen and pelvis.  No evidence of acute pathology within chest, abdomen and pelvis.    --- End of Report ---            HALI DORANTES MD; Attending Radiologist  This document has been electronically signed. Jun 14 2025  2:45PM (06-14-25 @ 10:54)      
SUBJECTIVE/OVERNIGHT EVENTS  Today is hospital day 1d. This morning patient was seen and examined at bedside, resting comfortably in bed. No acute or major events overnight.    MEDICATIONS  STANDING MEDICATIONS  aspirin  chewable 81 milliGRAM(s) Oral daily  atorvastatin 80 milliGRAM(s) Oral at bedtime  dextrose 5%. 1000 milliLiter(s) IV Continuous <Continuous>  dextrose 5%. 1000 milliLiter(s) IV Continuous <Continuous>  dextrose 50% Injectable 25 Gram(s) IV Push once  dextrose 50% Injectable 12.5 Gram(s) IV Push once  dextrose 50% Injectable 25 Gram(s) IV Push once  glucagon  Injectable 1 milliGRAM(s) IntraMuscular once  insulin glargine Injectable (LANTUS) 13 Unit(s) SubCutaneous at bedtime  insulin lispro (ADMELOG) corrective regimen sliding scale   SubCutaneous three times a day before meals  metoprolol tartrate 50 milliGRAM(s) Oral every 12 hours  pantoprazole  Injectable 80 milliGRAM(s) IV Push once  pantoprazole  Injectable 40 milliGRAM(s) IV Push every 12 hours    PRN MEDICATIONS  dextrose Oral Gel 15 Gram(s) Oral once PRN    VITALS  T(F): 97.8 (06-14-25 @ 07:37), Max: 98.9 (06-14-25 @ 02:00)  HR: 95 (06-14-25 @ 07:37) (61 - 110)  BP: 115/82 (06-14-25 @ 07:37) (100/59 - 123/90)  RR: 18 (06-14-25 @ 07:37) (18 - 20)  SpO2: 97% (06-14-25 @ 07:37) (97% - 98%)  POCT Blood Glucose.: 174 mg/dL (06-14-25 @ 07:49)    PHYSICAL EXAM  GENERAL  ( + ) NAD, lying in bed comfortably     (  ) obtunded     (  ) lethargic     (  ) somnolent    HEAD  ( + ) Atraumatic     (  ) hematoma     (  ) laceration (specify location:       )     NECK  ( + ) Supple     (  ) neck stiffness     (  ) nuchal rigidity     (  )  no JVD     (  ) JVD present ( -- cm)    HEART  Rate -->  (  +) normal rate    (  ) bradycardic    (  ) tachycardic  Rhythm -->  (  ) regular    (  ) regularly irregular    (  ) irregularly irregular  Murmurs -->  (  ) normal s1/s2    (  ) systolic murmur    (  ) diastolic murmur    (  ) continuous murmur     (  ) S3 present    (  ) S4 present    LUNGS  (+  )Unlabored respirations     (  ) tachypnea  (  ) B/L air entry     (  ) decreased breath sounds in:  (location     )    (  ) no adventitious sound     (  ) crackles     (  ) wheezing      (  ) rhonchi      (specify location:       )  (  ) chest wall tenderness (specify location:       )    ABDOMEN  (+  ) Soft     (  ) tense   |   (  ) nondistended     (  ) distended   |   (  ) +BS     (  ) hypoactive bowel sounds     (  ) hyperactive bowel sounds  (  ) nontender     (  ) RUQ tenderness     (  ) RLQ tenderness     (  ) LLQ tenderness     (  ) epigastric tenderness     (  ) diffuse tenderness  (  ) Splenomegaly      (  ) Hepatomegaly      (  ) Jaundice     (  ) ecchymosis     EXTREMITIES  ( + ) Normal     (  ) Rash     (  ) ecchymosis     (  ) varicose veins      (  ) pitting edema     (  ) non-pitting edema   (  ) ulceration     (  ) gangrene:     (location:     )    NERVOUS SYSTEM  ( + ) A&Ox3     (  ) confused     (  ) lethargic  CN II-XII:     (  ) Intact     (  ) focal deficits  (Specify:     )   Upper extremities:     (  ) strength X/5     (  ) focal deficit (specify:    )  Lower extremities:     (  ) strength  X/5    (  ) focal deficit (specify:    )    LABS             7.7    2.76  )-----------( 75       ( 06-14-25 @ 06:28 )             23.5     134  |  106  |  21  -------------------------<  154   06-14-25 @ 06:28  4.3  |  19  |  1.1    Ca      8.3     06-14-25 @ 06:28    TPro  6.4  /  Alb  3.5  /  TBili  1.0  /  DBili  x   /  AST  8   /  ALT  8   /  AlkPhos  124  /  GGT  x     06-14-25 @ 06:28    PT/INR - ( 06-13-25 @ 21:49 )   PT: 14.20 sec[H];   INR: 1.20 ratio  PTT - ( 06-13-25 @ 21:49 )  PTT:25.9 sec    Troponin T, High Sensitivity Result: 18 ng/L (06-13-25 @ 21:49)    Urinalysis Basic - ( 14 Jun 2025 06:28 )    Color: x / Appearance: x / SG: x / pH: x  Gluc: 154 mg/dL / Ketone: x  / Bili: x / Urobili: x   Blood: x / Protein: x / Nitrite: x   Leuk Esterase: x / RBC: x / WBC x   Sq Epi: x / Non Sq Epi: x / Bacteria: x
Patient is a 64y old  Male who presents with a chief complaint of melena, anemia (06-15-25)      Pt seen and examined at bedside. No CP or SOB.          PAST MEDICAL & SURGICAL HISTORY:  Arthritis    HTN (hypertension)    HLD (hyperlipidemia)    CAD (coronary artery disease)    DM (diabetes mellitus)    History of colon resection    History of coronary artery stent placement        VITAL SIGNS (Last 24 hrs):  T(C): 37 (06-15-25 @ 13:23), Max: 37 (06-14-25 @ 15:54)  HR: 86 (06-15-25 @ 13:23) (76 - 87)  BP: 113/66 (06-15-25 @ 13:23) (103/65 - 113/66)  RR: 18 (06-15-25 @ 13:23) (18 - 18)  SpO2: 98% (06-15-25 @ 13:23) (98% - 98%)      PHYSICAL EXAM:  GENERAL: NAD, well-developed  HEAD:  Atraumatic, Normocephalic  EYES: EOMI, PERRLA, conjunctiva and sclera clear  NECK: Supple, No JVD  CHEST/LUNG: Clear to auscultation bilaterally; No wheeze  HEART: Regular rate and rhythm; No murmurs, rubs, or gallops  ABDOMEN: Soft, Nontender, Nondistended; Bowel sounds present  EXTREMITIES:  2+ Peripheral Pulses, No clubbing, cyanosis, or edema  PSYCH: AAOx3  NEUROLOGY: non-focal  SKIN: No rashes or lesions    Labs Reviewed  Spoke to patient in regards to abnormal labs.    CBC Full  -  ( 15 Ramana 2025 07:21 )  WBC Count : 2.82 K/uL  Hemoglobin : 9.3 g/dL  Hematocrit : 28.2 %  Platelet Count - Automated : 75 K/uL  Mean Cell Volume : 92.8 fL  Mean Cell Hemoglobin : 30.6 pg  Mean Cell Hemoglobin Concentration : 33.0 g/dL  Auto Neutrophil # : x  Auto Lymphocyte # : x  Auto Monocyte # : x  Auto Eosinophil # : x  Auto Basophil # : x  Auto Neutrophil % : x  Auto Lymphocyte % : x  Auto Monocyte % : x  Auto Eosinophil % : x  Auto Basophil % : x    BMP:    06-15 @ 07:21    Blood Urea Nitrogen - 19  Calcium - 8.4  Carbond Dioxide - 21  Chloride - 101  Creatinine - 1.2  Glucose - 162  Potassium - 4.6  Sodium - 132      Hemoglobin A1c -   PT/INR - ( 13 Jun 2025 21:49 )   PT: 14.20 sec;   INR: 1.20 ratio         PTT - ( 13 Jun 2025 21:49 )  PTT:25.9 sec  Urine Culture:        COVID Labs  CRP:      D-Dimer:    Ferritin: 1485 ng/mL (06-13-25 @ 21:49)          Imaging reviewed independently and reviewed read    < from: CT Chest w/ IV Cont (06.14.25 @ 10:55) >      IMPRESSION:  No CT evidence of neoplastic process within chest, abdomen and pelvis.  No evidence of acute pathology within chest, abdomen and pelvis.    < end of copied text >      MEDICATIONS  (STANDING):  aspirin  chewable 81 milliGRAM(s) Oral daily  atorvastatin 80 milliGRAM(s) Oral at bedtime  dextrose 5%. 1000 milliLiter(s) (50 mL/Hr) IV Continuous <Continuous>  dextrose 5%. 1000 milliLiter(s) (100 mL/Hr) IV Continuous <Continuous>  dextrose 50% Injectable 25 Gram(s) IV Push once  dextrose 50% Injectable 12.5 Gram(s) IV Push once  dextrose 50% Injectable 25 Gram(s) IV Push once  glucagon  Injectable 1 milliGRAM(s) IntraMuscular once  insulin glargine Injectable (LANTUS) 13 Unit(s) SubCutaneous at bedtime  insulin lispro (ADMELOG) corrective regimen sliding scale   SubCutaneous three times a day before meals  metoprolol tartrate 50 milliGRAM(s) Oral every 12 hours  pantoprazole  Injectable 80 milliGRAM(s) IV Push once  pantoprazole  Injectable 40 milliGRAM(s) IV Push every 12 hours    MEDICATIONS  (PRN):  dextrose Oral Gel 15 Gram(s) Oral once PRN Blood Glucose LESS THAN 70 milliGRAM(s)/deciliter

## 2025-06-15 NOTE — PROGRESS NOTE ADULT - ASSESSMENT
63 yo M with hx of diverticulitis/colovesicular fistula s/p sigmoid bowel ressection w/ anastomosis, CAD s/p PCI on ASA/brilinta, HTN, HLD, DM who presents with sob, malaise, fatigue, tingling in hands, BLE swelling x3 wks. Says he gets sob walking <10 ft which si not normal for him. Noticed some dark stools. Went to PMD ( DR Florentino) for routine blood work and was called that he needed a blood transfusion so advised to go to ED. Hg noted to be 5.3 (last 14 in 2022). He was given 2 units PRBC and responded appropriately. Labs also revealing pancytopenia. HD stable. Patient endorse 60lb weight loss in the past year. Also endorses new onset diabetes. Endorses some intermittent dysphagia, sometimes it feels like food gets stuck but ultimately goes down. HD stable. ASHVIN shows light brown stool no melena or hematochezia. No fam Hx GI malignancy, mother had breast CA. Ex smoker, quit 20 years ago.     #Pancytopenia / unintentional weight loss / new onset DM - r/o malignancy, do not suspect GI bleed at this time   ~60lb weight loss in last year  diagnosed with DM in the past year  States may have had some intermittent dark colored stools over the past 2 months  Ex smoker  Last colonoscopy 7 years ago s/p bowel resection, reports unremarkable   Never had EGD  No fam Hx GI malignancy, endorses BC in mother  Hb 5.3->7.2 s/p 2 unit PRBC  ASHVNI shows light brown stool, negative for blood or melena   CT CAP unrevealing     Rec  Do not suspect GI bleeding, non LIZBETH. Rec  hematology eval in setting of pancytopenia   Patient would benefit from EGD/colonoscopy as outpatient. Can set up via [employer] (Patient wants to attend son's graduation on Wednesday). Would need to hold Brilinta for 5 days prior to procedure if no contraindication   Diet as tolerated   Monitor for overt bleeding or significant drop in hemoglobin

## 2025-06-15 NOTE — CONSULT NOTE ADULT - SUBJECTIVE AND OBJECTIVE BOX
Hematology Consult Note    HPI:  65 yo M with hx of CAD s/p PCI on ASA/brilinta, HTN, HLD, DM who presents with sob, malaise, fatigue, tingling in hands, BLE swelling x3 wks. Says he gets sob walking <10 ft which si not normal for him. Noticed some dark tarry stools. Went to [facility] ( DR Florentino) today for routine blood work and was called that he needed a blood transfusion so advised to go to ED. No fever, cp, abd pain, hematochezia, calf swelling/pain, hx of clots, hormone use, recent immobilization/surg, malignancy, hemoptysis. No hx of GIB or blood transfusion. Says last colonoscopy was 7 yrs ago when he had a fistula requiring partial colon resection. Per [facility], last hgb 14.1 on 3/7/22.    in the ED:  , /82, T36.9, RR 18, spO2 98%  labs: wbc 3.3, Hb 5.3, plts 115  Na 135, K 4.3, cr 1.2, bicarb 19, BUN 30  CXR doesn't look congested on my read    Pt received 2units prbc & admitted for melena.       (13 Jun 2025 23:41)      Allergies    shellfish (Anaphylaxis)  No Known Drug Allergies    Intolerances        MEDICATIONS  (STANDING):  aspirin  chewable 81 milliGRAM(s) Oral daily  atorvastatin 80 milliGRAM(s) Oral at bedtime  dextrose 5%. 1000 milliLiter(s) (50 mL/Hr) IV Continuous <Continuous>  dextrose 5%. 1000 milliLiter(s) (100 mL/Hr) IV Continuous <Continuous>  dextrose 50% Injectable 25 Gram(s) IV Push once  dextrose 50% Injectable 12.5 Gram(s) IV Push once  dextrose 50% Injectable 25 Gram(s) IV Push once  glucagon  Injectable 1 milliGRAM(s) IntraMuscular once  insulin glargine Injectable (LANTUS) 13 Unit(s) SubCutaneous at bedtime  insulin lispro (ADMELOG) corrective regimen sliding scale   SubCutaneous three times a day before meals  metoprolol tartrate 50 milliGRAM(s) Oral every 12 hours  pantoprazole  Injectable 80 milliGRAM(s) IV Push once  pantoprazole  Injectable 40 milliGRAM(s) IV Push every 12 hours    MEDICATIONS  (PRN):  dextrose Oral Gel 15 Gram(s) Oral once PRN Blood Glucose LESS THAN 70 milliGRAM(s)/deciliter      PAST MEDICAL & SURGICAL HISTORY:  Arthritis      HTN (hypertension)      HLD (hyperlipidemia)      CAD (coronary artery disease)      DM (diabetes mellitus)      History of colon resection      History of coronary artery stent placement          FAMILY HISTORY:  FH: CAD (coronary artery disease) (Father)        SOCIAL HISTORY: No EtOH, no tobacco    REVIEW OF SYSTEMS:    CONSTITUTIONAL: No weakness, fevers or chills  EYES/ENT: No visual changes;  No vertigo or throat pain   NECK: No pain or stiffness  RESPIRATORY: No cough, wheezing, hemoptysis; No shortness of breath  CARDIOVASCULAR: No chest pain or palpitations  GASTROINTESTINAL: No abdominal or epigastric pain. No nausea, vomiting, or hematemesis; No diarrhea or constipation. No melena or hematochezia.  GENITOURINARY: No dysuria, frequency or hematuria  NEUROLOGICAL: No numbness or weakness  SKIN: No itching, burning, rashes, or lesions   All other review of systems is negative unless indicated above.        T(F): 98.4 (06-15-25 @ 04:12), Max: 98.6 (06-14-25 @ 15:54)  HR: 76 (06-15-25 @ 04:12)  BP: 103/65 (06-15-25 @ 04:12)  RR: 18 (06-15-25 @ 04:12)  SpO2: 98% (06-15-25 @ 04:12)  Wt(kg): --    GENERAL: NAD, well-developed  HEAD:  Atraumatic, Normocephalic  EYES: EOMI, PERRLA, conjunctiva and sclera clear  NECK: Supple, No JVD  CHEST/LUNG: Clear to auscultation bilaterally; No wheeze  HEART: Regular rate and rhythm; No murmurs, rubs, or gallops  ABDOMEN: Soft, Nontender, Nondistended; Bowel sounds present  EXTREMITIES:  2+ Peripheral Pulses, No clubbing, cyanosis, or edema  NEUROLOGY: non-focal  SKIN: No rashes or lesions                        WORK UP Iron panel %sat 65, TIBC 205, ferritin 1485   B12 332, folate 12.1 Haptoglobin 131  bili 1 RPI <2   eGFR >60 alk phos mild elevation, LFT WNL  hep panel neg      Hematology Consult Note    HPI:  65 yo M with hx of CAD s/p PCI on ASA/brilinta, HTN, HLD, DM who presents with sob, malaise, fatigue, tingling in hands, BLE swelling x3 wks. Says he gets sob walking <10 ft which si not normal for him. Noticed some dark tarry stools. Went to [facility] ( DR Florentino) today for routine blood work and was called that he needed a blood transfusion so advised to go to ED. No fever, cp, abd pain, hematochezia, calf swelling/pain, hx of clots, hormone use, recent immobilization/surg, malignancy, hemoptysis. No hx of GIB or blood transfusion. Says last colonoscopy was 7 yrs ago when he had a fistula requiring partial colon resection. Per [facility], last hgb 14.1 on 3/7/22.    in the ED:  , /82, T36.9, RR 18, spO2 98%  labs: wbc 3.3, Hb 5.3, plts 115  Na 135, K 4.3, cr 1.2, bicarb 19, BUN 30  CXR doesn't look congested on my read    Pt received 2units prbc & admitted for melena.       (13 Jun 2025 23:41)      Allergies    shellfish (Anaphylaxis)  No Known Drug Allergies    Intolerances        MEDICATIONS  (STANDING):  aspirin  chewable 81 milliGRAM(s) Oral daily  atorvastatin 80 milliGRAM(s) Oral at bedtime  dextrose 5%. 1000 milliLiter(s) (50 mL/Hr) IV Continuous <Continuous>  dextrose 5%. 1000 milliLiter(s) (100 mL/Hr) IV Continuous <Continuous>  dextrose 50% Injectable 25 Gram(s) IV Push once  dextrose 50% Injectable 12.5 Gram(s) IV Push once  dextrose 50% Injectable 25 Gram(s) IV Push once  glucagon  Injectable 1 milliGRAM(s) IntraMuscular once  insulin glargine Injectable (LANTUS) 13 Unit(s) SubCutaneous at bedtime  insulin lispro (ADMELOG) corrective regimen sliding scale   SubCutaneous three times a day before meals  metoprolol tartrate 50 milliGRAM(s) Oral every 12 hours  pantoprazole  Injectable 80 milliGRAM(s) IV Push once  pantoprazole  Injectable 40 milliGRAM(s) IV Push every 12 hours    MEDICATIONS  (PRN):  dextrose Oral Gel 15 Gram(s) Oral once PRN Blood Glucose LESS THAN 70 milliGRAM(s)/deciliter      PAST MEDICAL & SURGICAL HISTORY:  Arthritis      HTN (hypertension)      HLD (hyperlipidemia)      CAD (coronary artery disease)      DM (diabetes mellitus)      History of colon resection      History of coronary artery stent placement          FAMILY HISTORY:  FH: CAD (coronary artery disease) (Father)        SOCIAL HISTORY: No EtOH, no tobacco    REVIEW OF SYSTEMS:  denies dizziness or SOB   Feels well  Would like to go home to attend son's grad         T(F): 98.4 (06-15-25 @ 04:12), Max: 98.6 (06-14-25 @ 15:54)  HR: 76 (06-15-25 @ 04:12)  BP: 103/65 (06-15-25 @ 04:12)  RR: 18 (06-15-25 @ 04:12)  SpO2: 98% (06-15-25 @ 04:12)  Wt(kg): --    GENERAL: NAD, well-developed  HEAD:  Atraumatic, Normocephalic  EYES: EOMI, PERRLA, conjunctiva pale  and sclera clear  NECK: Supple, No JVD  CHEST/LUNG: Clear to auscultation bilaterally; No wheeze  HEART: Regular rate and rhythm; No murmurs, rubs, or gallops  ABDOMEN: Soft, Nontender,   EXTREMITIES: no edema  NEUROLOGY: non-focal  SKIN: No rashes or lesions                        WORK UP Iron panel %sat 65, TIBC 205, ferritin 1485   B12 332, folate 12.1 Haptoglobin 131  bili 1 RPI <2   eGFR >60 alk phos mild elevation, LFT WNL  hep panel neg

## 2025-06-16 ENCOUNTER — TRANSCRIPTION ENCOUNTER (OUTPATIENT)
Age: 65
End: 2025-06-16

## 2025-06-16 VITALS
DIASTOLIC BLOOD PRESSURE: 72 MMHG | RESPIRATION RATE: 18 BRPM | OXYGEN SATURATION: 98 % | HEART RATE: 90 BPM | SYSTOLIC BLOOD PRESSURE: 126 MMHG | TEMPERATURE: 98 F

## 2025-06-16 LAB
BASOPHILS # BLD AUTO: 0.02 K/UL — SIGNIFICANT CHANGE UP (ref 0–0.2)
BASOPHILS NFR BLD AUTO: 0.8 % — SIGNIFICANT CHANGE UP (ref 0–1)
EOSINOPHIL # BLD AUTO: 0.07 K/UL — SIGNIFICANT CHANGE UP (ref 0–0.7)
EOSINOPHIL NFR BLD AUTO: 2.7 % — SIGNIFICANT CHANGE UP (ref 0–8)
GLUCOSE BLDC GLUCOMTR-MCNC: 202 MG/DL — HIGH (ref 70–99)
HCT VFR BLD CALC: 29.3 % — LOW (ref 42–52)
HGB BLD-MCNC: 9.9 G/DL — LOW (ref 14–18)
IMM GRANULOCYTES NFR BLD AUTO: 1.2 % — HIGH (ref 0.1–0.3)
LYMPHOCYTES # BLD AUTO: 1.29 K/UL — SIGNIFICANT CHANGE UP (ref 1.2–3.4)
LYMPHOCYTES # BLD AUTO: 50.2 % — SIGNIFICANT CHANGE UP (ref 20.5–51.1)
MCHC RBC-ENTMCNC: 30.9 PG — SIGNIFICANT CHANGE UP (ref 27–31)
MCHC RBC-ENTMCNC: 33.8 G/DL — SIGNIFICANT CHANGE UP (ref 32–37)
MCV RBC AUTO: 91.6 FL — SIGNIFICANT CHANGE UP (ref 80–94)
MONOCYTES # BLD AUTO: 0.34 K/UL — SIGNIFICANT CHANGE UP (ref 0.1–0.6)
MONOCYTES NFR BLD AUTO: 13.2 % — HIGH (ref 1.7–9.3)
NEUTROPHILS # BLD AUTO: 0.82 K/UL — LOW (ref 1.4–6.5)
NEUTROPHILS NFR BLD AUTO: 31.9 % — LOW (ref 42.2–75.2)
NRBC BLD AUTO-RTO: 0 /100 WBCS — SIGNIFICANT CHANGE UP (ref 0–0)
PLATELET # BLD AUTO: 100 K/UL — LOW (ref 130–400)
PLATELET # BLD AUTO: 113 K/UL — LOW (ref 130–400)
PMV BLD: 12.5 FL — HIGH (ref 7.4–10.4)
PMV BLD: 13.2 FL — HIGH (ref 7.4–10.4)
RBC # BLD: 3.2 M/UL — LOW (ref 4.7–6.1)
RBC # FLD: 17.6 % — HIGH (ref 11.5–14.5)
WBC # BLD: 2.57 K/UL — LOW (ref 4.8–10.8)
WBC # FLD AUTO: 2.57 K/UL — LOW (ref 4.8–10.8)

## 2025-06-16 PROCEDURE — 88189 FLOWCYTOMETRY/READ 16 & >: CPT | Mod: 59

## 2025-06-16 PROCEDURE — 99239 HOSP IP/OBS DSCHRG MGMT >30: CPT

## 2025-06-16 PROCEDURE — 88291 CYTO/MOLECULAR REPORT: CPT

## 2025-06-16 RX ADMIN — Medication 81 MILLIGRAM(S): at 12:06

## 2025-06-16 RX ADMIN — INSULIN LISPRO 2: 100 INJECTION, SOLUTION INTRAVENOUS; SUBCUTANEOUS at 07:56

## 2025-06-16 RX ADMIN — INSULIN LISPRO 3: 100 INJECTION, SOLUTION INTRAVENOUS; SUBCUTANEOUS at 12:06

## 2025-06-16 RX ADMIN — Medication 40 MILLIGRAM(S): at 05:19

## 2025-06-16 RX ADMIN — METOPROLOL SUCCINATE 50 MILLIGRAM(S): 50 TABLET, EXTENDED RELEASE ORAL at 05:19

## 2025-06-16 RX ADMIN — TICAGRELOR 60 MILLIGRAM(S): 90 TABLET ORAL at 05:19

## 2025-06-16 NOTE — DISCHARGE NOTE PROVIDER - PROVIDER TOKENS
PROVIDER:[TOKEN:[[id number]],FOLLOWUP:[2 weeks]],PROVIDER:[TOKEN:[[id number]],FOLLOWUP:[2 weeks]]

## 2025-06-16 NOTE — DISCHARGE NOTE PROVIDER - NSDCFUSCHEDAPPT_GEN_ALL_CORE_FT
[facility]  CARDIOLOGY [address] [place] [address]  Scheduled Appointment: 07/11/2025

## 2025-06-16 NOTE — DISCHARGE NOTE PROVIDER - NSDCCPCAREPLAN_GEN_ALL_CORE_FT
PRINCIPAL DISCHARGE DIAGNOSIS  Diagnosis: Symptomatic anemia  Assessment and Plan of Treatment: You also noticed dark, tarry stools for the past two days.  - You've had melena (dark stools caused by bleeding) that has improved after receiving three units of blood, but you’ll need one more unit to keep your hemoglobin level above 8.  - You have pancytopenia, meaning you're experiencing a reduction in blood cells, and there's suspicion of a serious condition.  - You have macrocytic anemia, possibly related to blood cell changes and have unintentionally lost about 60 pounds over the past year.  - You were seen in the emergency department and are now stable, although you had a fast heart rate.  - Your stool is now light brown and doesn’t show blood or melena.  - You should discontinue IV PPI twice a day and switch to a daily oral PPI.  - The gastroenterology team recommends you get an EGD/colonoscopy as an outpatient procedure. You should stop taking Brilinta five days before this procedure.  - Your panoramic scan was negative for any immediate issues.  - A hematologist was consulted, and you had a bone marrow test. You'll follow up with Dr. Beebe for the results.  - Your vitamin B12, folate, and hepatitis panel came back normal.  - You're also being tested for HTLV-1 and other blood flow complications. Again, follow up with Dr. Beebe for these results.  Regarding your diabetes, continue taking Metformin and Jardiance, and adjust your insulin as needed.  You are stable and ready to go home. Please follow up with Dr. Beebe in their office to discuss your biopsy results.

## 2025-06-16 NOTE — DISCHARGE NOTE PROVIDER - CARE PROVIDERS DIRECT ADDRESSES
,DirectAddress_Unknown,[email or web address]

## 2025-06-16 NOTE — DISCHARGE NOTE PROVIDER - HOSPITAL COURSE
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 Says he gets dyepneic walking <10 ft for weeks which si not normal for him. Noticed some dark tarry stools since 2d ago. Went to [facility] ( DR Florention) today for routine blood work and was called that he needed a blood transfusion so advised to go to ED. No fever, cp, abd pain, hematochezia, calf swelling/pain, hx of clots, hormone use, recent immobilization/surg, malignancy, hemoptysis. No hx of GIB or blood transfusion. Says last colonoscopy was 7 yrs ago when he had a fistula requiring partial colon resection. Per [facility], last hgb 14.1 on 3/7/22.    #melena - resolved s/p 3 units will need another   #pancytopenia  suspected Malignancy   #macrocytic anemia   #unintentional weight loss   #new onset DM   #Hx of diverticulitis s/p partial colon resection  7y ago  - on admission Hb 5.3 > 7.7 > 10.1  - presents with sob, malaise, fatigue, tingling in hands, LE swelling, & dyspnea x3 wks   - lost ~60lb weight loss in last year  - had tarry black stools, but last BM was brown   - in the ED: pt HD stable, tachycardic  - ASHVIN: light brown stool, negative for blood or melena  - will give one more unit today to keep hb > 8   - dc PPI IV bid, ppi daily   - per GI: obtain EGD/colonoscopy as outpatient, hold Brilinta for 5 days prior to procedure.  - Pan scan neg   - heme/onc consulted, emma [facility], underwent BM will follow up results outpatient with Dr Beebe  - wnl b12, folate, hepatitis panel   - HTLV-1 and peripheral flow sent, will follow up results with Dr Beebe    #H/o NSTEMI in January 2022   #Hx of HTN & HLD  -s/p 3 PCIs  -takes aspirin, brilinta  -emma [facility], ok to restart brillinta    #DM  -takes metformin & jardiance  - insulin & adjust as needed    Patient is clinically and vitally stable and ready to be discharged home with recommendation to follow up with Dr. Beebe in the office for the biopsy results.

## 2025-06-16 NOTE — DISCHARGE NOTE PROVIDER - NSDCMRMEDTOKEN_GEN_ALL_CORE_FT
aspirin 81 mg oral tablet, chewable: 1 tab(s) orally once a day  atorvastatin 80 mg oral tablet: 1 tab(s) orally once a day (at bedtime)  bisacodyl 5 mg oral tablet: 4 tab(s) orally once Take at 9PM night before procedure  empagliflozin 25 mg oral tablet: 1 tab(s) orally once a day  GoLYTELY oral powder for reconstitution: 4,000 milliliter(s) orally once Take night before procedure starting at 4PM. Clear liquid diet day before procedure. NPO after midnight  MetFORMIN (Eqv-Fortamet) 500 mg oral tablet, extended release: 1 tab(s) orally 2 times a day  metoprolol tartrate 50 mg oral tablet: 1 tab(s) orally 2 times a day  pantoprazole 40 mg oral delayed release tablet: 1 tab(s) orally once a day  ticagrelor 60 mg oral tablet: 1 tab(s) orally 2 times a day

## 2025-06-16 NOTE — DISCHARGE NOTE NURSING/CASE MANAGEMENT/SOCIAL WORK - PATIENT PORTAL LINK FT
You can access the FollowMyHealth Patient Portal offered by [facility] by registering at the following website: [email or web address]. By joining [facility]’s FollowMyHealth portal, you will also be able to view your health information using other applications (apps) compatible with our system.

## 2025-06-16 NOTE — DISCHARGE NOTE NURSING/CASE MANAGEMENT/SOCIAL WORK - FINANCIAL ASSISTANCE
[facility] provides services at a reduced cost to those who are determined to be eligible through [facility]’s financial assistance program. Information regarding [facility]’s financial assistance program can be found by going to [email or web address] or by calling [phone].

## 2025-06-16 NOTE — DISCHARGE NOTE NURSING/CASE MANAGEMENT/SOCIAL WORK - NSDCPEFALRISK_GEN_ALL_CORE
For information on Fall & Injury Prevention, visit: [email or web address] OR  [email or web address] OR  https://www.cdc.gov/steadi/patient.html

## 2025-06-16 NOTE — DISCHARGE NOTE PROVIDER - CARE PROVIDER_API CALL
Ledy Beebe Faat [facility]  [address] [place] [id number]  Phone: [phone]  Fax: [phone]  Follow Up Time: 2 weeks    Wade Oseguera  Gastroenterology  [address] [place] [address]  Phone: [phone]  Fax: [phone]  Follow Up Time: 2 weeks

## 2025-06-17 PROBLEM — I10 ESSENTIAL (PRIMARY) HYPERTENSION: Chronic | Status: ACTIVE | Noted: 2025-06-13

## 2025-06-17 PROBLEM — E11.9 TYPE 2 DIABETES MELLITUS WITHOUT COMPLICATIONS: Chronic | Status: ACTIVE | Noted: 2025-06-13

## 2025-06-17 PROBLEM — I25.10 ATHEROSCLEROTIC HEART DISEASE OF NATIVE CORONARY ARTERY WITHOUT ANGINA PECTORIS: Chronic | Status: ACTIVE | Noted: 2025-06-13

## 2025-06-18 LAB
FLOW CYTOMETRY FINAL REPORT: SIGNIFICANT CHANGE UP
HTLV I + HTLV II ANTIBODY SCREEN: NEGATIVE — SIGNIFICANT CHANGE UP

## 2025-06-19 ENCOUNTER — OUTPATIENT (OUTPATIENT)
Dept: OUTPATIENT SERVICES | Facility: HOSPITAL | Age: 65
LOS: 1 days | End: 2025-06-19
Payer: COMMERCIAL

## 2025-06-19 ENCOUNTER — APPOINTMENT (OUTPATIENT)
Age: 65
End: 2025-06-19

## 2025-06-19 DIAGNOSIS — D61.810 ANTINEOPLASTIC CHEMOTHERAPY INDUCED PANCYTOPENIA: ICD-10-CM

## 2025-06-19 DIAGNOSIS — Z95.5 PRESENCE OF CORONARY ANGIOPLASTY IMPLANT AND GRAFT: Chronic | ICD-10-CM

## 2025-06-19 DIAGNOSIS — Z90.49 ACQUIRED ABSENCE OF OTHER SPECIFIED PARTS OF DIGESTIVE TRACT: Chronic | ICD-10-CM

## 2025-06-19 PROBLEM — E78.5 HYPERLIPIDEMIA, UNSPECIFIED: Chronic | Status: ACTIVE | Noted: 2025-06-13

## 2025-06-19 LAB
AUTO BASOPHILS #: 0.03 K/UL
AUTO BASOPHILS %: 1.2 %
AUTO EOSINOPHILS #: 0.1 K/UL
AUTO EOSINOPHILS %: 3.9 %
AUTO IMMATURE GRANULOCYTES #: 0.03 K/UL
AUTO LYMPHOCYTES #: 1.51 K/UL
AUTO LYMPHOCYTES %: 58.5 %
AUTO MONOCYTES #: 0.33 K/UL
AUTO MONOCYTES %: 12.8 %
AUTO NEUTROPHILS #: 0.58 K/UL
AUTO NEUTROPHILS %: 22.4 %
AUTO NRBC #: 0 K/UL
CHROM ANALY INTERPHASE BLD FISH-IMP: SIGNIFICANT CHANGE UP
HCT VFR BLD CALC: 26.9 %
HGB BLD-MCNC: 9.1 G/DL
IMM GRANULOCYTES NFR BLD AUTO: 1.2 %
IMMATURE PLATELET FRACTION #: 1.6 K/UL
IMMATURE PLATELET FRACTION %: 1.8 %
MAN DIFF?: NORMAL
MCHC RBC-ENTMCNC: 31.1 PG
MCHC RBC-ENTMCNC: 33.8 G/DL
MCV RBC AUTO: 91.8 FL
PLATELET # BLD AUTO: 87 K/UL
PMV BLD AUTO: 0 /100 WBCS
PMV BLD: 9.2 FL
RBC # BLD: 2.93 M/UL
RBC # FLD: 17.3 %
WBC # FLD AUTO: 2.58 K/UL

## 2025-06-19 PROCEDURE — 85025 COMPLETE CBC W/AUTO DIFF WBC: CPT

## 2025-06-19 PROCEDURE — 36416 COLLJ CAPILLARY BLOOD SPEC: CPT

## 2025-06-19 PROCEDURE — 36415 COLL VENOUS BLD VENIPUNCTURE: CPT

## 2025-06-20 DIAGNOSIS — D61.810 ANTINEOPLASTIC CHEMOTHERAPY INDUCED PANCYTOPENIA: ICD-10-CM

## 2025-06-24 LAB — CHROM ANALY OVERALL INTERP SPEC-IMP: SIGNIFICANT CHANGE UP

## 2025-06-26 ENCOUNTER — APPOINTMENT (OUTPATIENT)
Age: 65
End: 2025-06-26

## 2025-06-26 LAB
AUTO BASOPHILS #: 0.02 K/UL
AUTO BASOPHILS %: 0.4 %
AUTO EOSINOPHILS #: 0.13 K/UL
AUTO EOSINOPHILS %: 2.6 %
AUTO IMMATURE GRANULOCYTES #: 0.05 K/UL
AUTO LYMPHOCYTES #: 1.93 K/UL
AUTO LYMPHOCYTES %: 38.1 %
AUTO MONOCYTES #: 0.52 K/UL
AUTO MONOCYTES %: 10.3 %
AUTO NEUTROPHILS #: 2.41 K/UL
AUTO NEUTROPHILS %: 47.6 %
AUTO NRBC #: 0 K/UL
HCT VFR BLD CALC: 26.9 %
HGB BLD-MCNC: 9.2 G/DL
IMM GRANULOCYTES NFR BLD AUTO: 1 %
MAN DIFF?: NORMAL
MCHC RBC-ENTMCNC: 31 PG
MCHC RBC-ENTMCNC: 34.2 G/DL
MCV RBC AUTO: 90.6 FL
PLATELET # BLD AUTO: 134 K/UL
PMV BLD AUTO: 0 /100 WBCS
PMV BLD: 10.8 FL
RBC # BLD: 2.97 M/UL
RBC # FLD: 17.2 %
WBC # FLD AUTO: 5.06 K/UL

## 2025-06-30 ENCOUNTER — RESULT REVIEW (OUTPATIENT)
Age: 65
End: 2025-06-30

## 2025-06-30 ENCOUNTER — TRANSCRIPTION ENCOUNTER (OUTPATIENT)
Age: 65
End: 2025-06-30

## 2025-06-30 ENCOUNTER — OUTPATIENT (OUTPATIENT)
Dept: OUTPATIENT SERVICES | Facility: HOSPITAL | Age: 65
LOS: 1 days | Discharge: HOME | End: 2025-06-30
Payer: COMMERCIAL

## 2025-06-30 VITALS
HEIGHT: 66 IN | RESPIRATION RATE: 18 BRPM | OXYGEN SATURATION: 98 % | DIASTOLIC BLOOD PRESSURE: 67 MMHG | TEMPERATURE: 98 F | WEIGHT: 147.05 LBS | HEART RATE: 98 BPM | SYSTOLIC BLOOD PRESSURE: 115 MMHG

## 2025-06-30 VITALS
HEART RATE: 81 BPM | SYSTOLIC BLOOD PRESSURE: 112 MMHG | RESPIRATION RATE: 22 BRPM | OXYGEN SATURATION: 98 % | TEMPERATURE: 97 F | DIASTOLIC BLOOD PRESSURE: 69 MMHG

## 2025-06-30 DIAGNOSIS — K57.30 DIVERTICULOSIS OF LARGE INTESTINE WITHOUT PERFORATION OR ABSCESS WITHOUT BLEEDING: ICD-10-CM

## 2025-06-30 DIAGNOSIS — Z79.84 LONG TERM (CURRENT) USE OF ORAL HYPOGLYCEMIC DRUGS: ICD-10-CM

## 2025-06-30 DIAGNOSIS — E11.9 TYPE 2 DIABETES MELLITUS WITHOUT COMPLICATIONS: ICD-10-CM

## 2025-06-30 DIAGNOSIS — D64.9 ANEMIA, UNSPECIFIED: ICD-10-CM

## 2025-06-30 DIAGNOSIS — Z95.5 PRESENCE OF CORONARY ANGIOPLASTY IMPLANT AND GRAFT: Chronic | ICD-10-CM

## 2025-06-30 DIAGNOSIS — R13.10 DYSPHAGIA, UNSPECIFIED: ICD-10-CM

## 2025-06-30 DIAGNOSIS — K62.1 RECTAL POLYP: ICD-10-CM

## 2025-06-30 DIAGNOSIS — Z79.82 LONG TERM (CURRENT) USE OF ASPIRIN: ICD-10-CM

## 2025-06-30 DIAGNOSIS — I25.10 ATHEROSCLEROTIC HEART DISEASE OF NATIVE CORONARY ARTERY WITHOUT ANGINA PECTORIS: ICD-10-CM

## 2025-06-30 DIAGNOSIS — E78.5 HYPERLIPIDEMIA, UNSPECIFIED: ICD-10-CM

## 2025-06-30 DIAGNOSIS — K44.9 DIAPHRAGMATIC HERNIA WITHOUT OBSTRUCTION OR GANGRENE: ICD-10-CM

## 2025-06-30 DIAGNOSIS — Z90.49 ACQUIRED ABSENCE OF OTHER SPECIFIED PARTS OF DIGESTIVE TRACT: Chronic | ICD-10-CM

## 2025-06-30 DIAGNOSIS — D12.3 BENIGN NEOPLASM OF TRANSVERSE COLON: ICD-10-CM

## 2025-06-30 DIAGNOSIS — Z95.5 PRESENCE OF CORONARY ANGIOPLASTY IMPLANT AND GRAFT: ICD-10-CM

## 2025-06-30 DIAGNOSIS — K64.4 RESIDUAL HEMORRHOIDAL SKIN TAGS: ICD-10-CM

## 2025-06-30 DIAGNOSIS — R63.4 ABNORMAL WEIGHT LOSS: ICD-10-CM

## 2025-06-30 DIAGNOSIS — K64.8 OTHER HEMORRHOIDS: ICD-10-CM

## 2025-06-30 DIAGNOSIS — I10 ESSENTIAL (PRIMARY) HYPERTENSION: ICD-10-CM

## 2025-06-30 DIAGNOSIS — K31.89 OTHER DISEASES OF STOMACH AND DUODENUM: ICD-10-CM

## 2025-06-30 PROCEDURE — 43239 EGD BIOPSY SINGLE/MULTIPLE: CPT | Mod: XS

## 2025-06-30 PROCEDURE — 88305 TISSUE EXAM BY PATHOLOGIST: CPT

## 2025-06-30 PROCEDURE — 45385 COLONOSCOPY W/LESION REMOVAL: CPT

## 2025-06-30 PROCEDURE — 88312 SPECIAL STAINS GROUP 1: CPT

## 2025-06-30 PROCEDURE — 88312 SPECIAL STAINS GROUP 1: CPT | Mod: 26

## 2025-06-30 PROCEDURE — 88305 TISSUE EXAM BY PATHOLOGIST: CPT | Mod: 26

## 2025-06-30 RX ORDER — METFORMIN HYDROCHLORIDE 850 MG/1
1 TABLET ORAL
Refills: 0 | DISCHARGE

## 2025-06-30 RX ORDER — EMPAGLIFLOZIN 25 MG/1
1 TABLET, FILM COATED ORAL
Refills: 0 | DISCHARGE

## 2025-06-30 RX ORDER — TICAGRELOR 90 MG/1
1 TABLET ORAL
Refills: 0 | DISCHARGE

## 2025-06-30 NOTE — ASU DISCHARGE PLAN (ADULT/PEDIATRIC) - NS MD DC FALL RISK RISK
For information on Fall & Injury Prevention, visit: [email or web address] OR  [email or web address] OR  https://www.cdc.gov/steadi/patient.html

## 2025-06-30 NOTE — ASU PREOP CHECKLIST - INTERNAL PROSTHESES
Received portal request:    Appointment canceled for Gregg Blakely ([id number])  Visit Type: FOLLOW-UP VISIT  Date        Time      Length    Provider                  Department  8/22/2023    3:45 PM  30 mins.  [facility]     Reason for Cancellation: Decline/Other     Patient Comments: Insurance has informed me I have to see a doctor in [place] at [facility] to satisfy their requirement to be in network I have not received an initial appointment yet but a referral has been submitted    Routing as an FYI   cardiac stent/yes(specify)

## 2025-06-30 NOTE — ASU DISCHARGE PLAN (ADULT/PEDIATRIC) - FINANCIAL ASSISTANCE
[facility] provides services at a reduced cost to those who are determined to be eligible through [facility]’s financial assistance program. Information regarding [facility]’s financial assistance program can be found by going to [email or web address] or by calling [phone].

## 2025-06-30 NOTE — ASU PATIENT PROFILE, ADULT - NSICDXPASTSURGICALHX_GEN_ALL_CORE_FT
PAST SURGICAL HISTORY:  History of colon resection fistula    History of coronary artery stent placement

## 2025-06-30 NOTE — H&P PST ADULT - HISTORY OF PRESENT ILLNESS
59 yo pt here for EGD and colon for weight loss and anemia. Pt with new diagnosis of Hodgkin lymphoma.

## 2025-07-01 LAB
SURGICAL PATHOLOGY STUDY: SIGNIFICANT CHANGE UP
SURGICAL PATHOLOGY STUDY: SIGNIFICANT CHANGE UP

## 2025-07-02 LAB
CHROM ANALY INTERPHASE BLD FISH-IMP: SIGNIFICANT CHANGE UP
CHROM ANALY INTERPHASE BLD FISH-IMP: SIGNIFICANT CHANGE UP

## 2025-07-03 ENCOUNTER — APPOINTMENT (OUTPATIENT)
Age: 65
End: 2025-07-03

## 2025-07-06 LAB
AUTO BASOPHILS #: 0.02 K/UL
AUTO BASOPHILS %: 0.4 %
AUTO EOSINOPHILS #: 0.02 K/UL
AUTO EOSINOPHILS %: 0.4 %
AUTO IMMATURE GRANULOCYTES #: 0.05 K/UL
AUTO LYMPHOCYTES #: 2.81 K/UL
AUTO LYMPHOCYTES %: 52.3 %
AUTO MONOCYTES #: 0.52 K/UL
AUTO MONOCYTES %: 9.7 %
AUTO NEUTROPHILS #: 1.95 K/UL
AUTO NEUTROPHILS %: 36.3 %
AUTO NRBC #: 0 K/UL
HCT VFR BLD CALC: 26.4 %
HGB BLD-MCNC: 8.9 G/DL
HIV1+2 AB SPEC QL IA.RAPID: NONREACTIVE
IMM GRANULOCYTES NFR BLD AUTO: 0.9 %
MAN DIFF?: NORMAL
MCHC RBC-ENTMCNC: 30.3 PG
MCHC RBC-ENTMCNC: 33.7 G/DL
MCV RBC AUTO: 89.8 FL
PLATELET # BLD AUTO: 133 K/UL
PMV BLD AUTO: 0 /100 WBCS
PMV BLD: 11.2 FL
RBC # BLD: 2.94 M/UL
RBC # FLD: 17.4 %
WBC # FLD AUTO: 5.37 K/UL

## 2025-07-09 ENCOUNTER — APPOINTMENT (OUTPATIENT)
Age: 65
End: 2025-07-09
Payer: COMMERCIAL

## 2025-07-09 VITALS
HEART RATE: 90 BPM | BODY MASS INDEX: 24.75 KG/M2 | OXYGEN SATURATION: 99 % | SYSTOLIC BLOOD PRESSURE: 117 MMHG | RESPIRATION RATE: 16 BRPM | DIASTOLIC BLOOD PRESSURE: 71 MMHG | TEMPERATURE: 97.8 F | WEIGHT: 145 LBS | HEIGHT: 64.11 IN

## 2025-07-09 LAB
ALBUMIN SERPL ELPH-MCNC: 3.7 G/DL
ALP BLD-CCNC: 150 U/L
ALT SERPL-CCNC: 12 U/L
ANION GAP SERPL CALC-SCNC: 12 MMOL/L
AST SERPL-CCNC: 11 U/L
AUTO BASOPHILS #: 0.01 K/UL
AUTO BASOPHILS %: 0.3 %
AUTO EOSINOPHILS #: 0.01 K/UL
AUTO EOSINOPHILS %: 0.3 %
AUTO IMMATURE GRANULOCYTES #: 0.04 K/UL
AUTO LYMPHOCYTES #: 1.46 K/UL
AUTO LYMPHOCYTES %: 46.8 %
AUTO MONOCYTES #: 0.28 K/UL
AUTO MONOCYTES %: 9 %
AUTO NEUTROPHILS #: 1.32 K/UL
AUTO NEUTROPHILS %: 42.3 %
AUTO NRBC #: 0.02 K/UL
BILIRUB SERPL-MCNC: 0.4 MG/DL
BUN SERPL-MCNC: 25 MG/DL
CALCIUM SERPL-MCNC: 9 MG/DL
CHLORIDE SERPL-SCNC: 102 MMOL/L
CO2 SERPL-SCNC: 22 MMOL/L
CREAT SERPL-MCNC: 0.9 MG/DL
CRP SERPL-MCNC: <3 MG/L
EGFRCR SERPLBLD CKD-EPI 2021: 95 ML/MIN/1.73M2
ERYTHROCYTE [SEDIMENTATION RATE] IN BLOOD BY WESTERGREN METHOD: 83 MM/HR
GLUCOSE SERPL-MCNC: 357 MG/DL
HCT VFR BLD CALC: 25.4 %
HGB BLD-MCNC: 8.4 G/DL
IMM GRANULOCYTES NFR BLD AUTO: 1.3 %
IMMATURE PLATELET FRACTION #: 1.6 K/UL
IMMATURE PLATELET FRACTION %: 1.4 %
LDH SERPL-CCNC: 200 U/L
MAN DIFF?: NORMAL
MCHC RBC-ENTMCNC: 30.7 PG
MCHC RBC-ENTMCNC: 33.1 G/DL
MCV RBC AUTO: 92.7 FL
PLATELET # BLD AUTO: 115 K/UL
PMV BLD AUTO: 0 /100 WBCS
PMV BLD: 9.5 FL
POTASSIUM SERPL-SCNC: 4.3 MMOL/L
PROT SERPL-MCNC: 7.1 G/DL
RBC # BLD: 2.74 M/UL
RBC # FLD: 17.6 %
SODIUM SERPL-SCNC: 136 MMOL/L
URATE SERPL-MCNC: 4.3 MG/DL
WBC # FLD AUTO: 3.12 K/UL

## 2025-07-09 PROCEDURE — G2211 COMPLEX E/M VISIT ADD ON: CPT | Mod: NC

## 2025-07-09 PROCEDURE — 99205 OFFICE O/P NEW HI 60 MIN: CPT

## 2025-07-11 ENCOUNTER — NON-APPOINTMENT (OUTPATIENT)
Age: 65
End: 2025-07-11

## 2025-07-11 ENCOUNTER — APPOINTMENT (OUTPATIENT)
Dept: CARDIOLOGY | Facility: CLINIC | Age: 65
End: 2025-07-11
Payer: COMMERCIAL

## 2025-07-11 VITALS
HEIGHT: 64 IN | SYSTOLIC BLOOD PRESSURE: 110 MMHG | DIASTOLIC BLOOD PRESSURE: 70 MMHG | HEART RATE: 83 BPM | WEIGHT: 145 LBS | BODY MASS INDEX: 24.75 KG/M2

## 2025-07-11 PROBLEM — C81.90 HODGKIN LYMPHOMA: Status: ACTIVE | Noted: 2025-06-27

## 2025-07-11 PROCEDURE — 99214 OFFICE O/P EST MOD 30 MIN: CPT | Mod: 25

## 2025-07-11 PROCEDURE — 93000 ELECTROCARDIOGRAM COMPLETE: CPT

## 2025-07-11 RX ORDER — EMPAGLIFLOZIN 25 MG/1
25 TABLET, FILM COATED ORAL
Refills: 0 | Status: ACTIVE | COMMUNITY

## 2025-07-17 ENCOUNTER — APPOINTMENT (OUTPATIENT)
Age: 65
End: 2025-07-17

## 2025-07-17 LAB
AUTO BASOPHILS #: 0.03 K/UL
AUTO BASOPHILS %: 1.1 %
AUTO EOSINOPHILS #: 0.1 K/UL
AUTO EOSINOPHILS %: 3.6 %
AUTO IMMATURE GRANULOCYTES #: 0.03 K/UL
AUTO LYMPHOCYTES #: 1.23 K/UL
AUTO LYMPHOCYTES %: 43.8 %
AUTO MONOCYTES #: 0.4 K/UL
AUTO MONOCYTES %: 14.2 %
AUTO NEUTROPHILS #: 1.02 K/UL
AUTO NEUTROPHILS %: 36.2 %
AUTO NRBC #: 0 K/UL
HCT VFR BLD CALC: 25 %
HGB BLD-MCNC: 8.3 G/DL
IMM GRANULOCYTES NFR BLD AUTO: 1.1 %
IMMATURE PLATELET FRACTION #: 1.7 K/UL
IMMATURE PLATELET FRACTION %: 1.4 %
MAN DIFF?: NORMAL
MCHC RBC-ENTMCNC: 30.4 PG
MCHC RBC-ENTMCNC: 33.2 G/DL
MCV RBC AUTO: 91.6 FL
PLATELET # BLD AUTO: 118 K/UL
PMV BLD AUTO: 0 /100 WBCS
PMV BLD: 9.3 FL
RBC # BLD: 2.73 M/UL
RBC # FLD: 17.9 %
WBC # FLD AUTO: 2.81 K/UL

## 2025-07-21 ENCOUNTER — OUTPATIENT (OUTPATIENT)
Dept: OUTPATIENT SERVICES | Facility: HOSPITAL | Age: 65
LOS: 1 days | End: 2025-07-21
Payer: COMMERCIAL

## 2025-07-21 ENCOUNTER — RESULT REVIEW (OUTPATIENT)
Age: 65
End: 2025-07-21

## 2025-07-21 DIAGNOSIS — Z95.5 PRESENCE OF CORONARY ANGIOPLASTY IMPLANT AND GRAFT: Chronic | ICD-10-CM

## 2025-07-21 DIAGNOSIS — Z90.49 ACQUIRED ABSENCE OF OTHER SPECIFIED PARTS OF DIGESTIVE TRACT: Chronic | ICD-10-CM

## 2025-07-21 DIAGNOSIS — C81.90 HODGKIN LYMPHOMA, UNSPECIFIED, UNSPECIFIED SITE: ICD-10-CM

## 2025-07-21 LAB — GLUCOSE BLDC GLUCOMTR-MCNC: 114 MG/DL — HIGH (ref 70–99)

## 2025-07-21 PROCEDURE — A9552: CPT

## 2025-07-21 PROCEDURE — 82962 GLUCOSE BLOOD TEST: CPT

## 2025-07-21 PROCEDURE — 78815 PET IMAGE W/CT SKULL-THIGH: CPT | Mod: PI

## 2025-07-21 PROCEDURE — 78815 PET IMAGE W/CT SKULL-THIGH: CPT | Mod: 26,PI

## 2025-07-22 DIAGNOSIS — C81.90 HODGKIN LYMPHOMA, UNSPECIFIED, UNSPECIFIED SITE: ICD-10-CM

## 2025-07-24 ENCOUNTER — APPOINTMENT (OUTPATIENT)
Age: 65
End: 2025-07-24
Payer: COMMERCIAL

## 2025-07-24 LAB
ABORH: NORMAL
ANTIBODY SCREEN: NORMAL
AUTO MONOCYTES %: 12.5 %
AUTO MONOCYTES %: 12.5 %
WBC # FLD AUTO: 2.65 K/UL

## 2025-07-24 PROCEDURE — 99214 OFFICE O/P EST MOD 30 MIN: CPT

## 2025-07-25 ENCOUNTER — APPOINTMENT (OUTPATIENT)
Age: 65
End: 2025-07-25

## 2025-07-31 ENCOUNTER — APPOINTMENT (OUTPATIENT)
Age: 65
End: 2025-07-31

## 2025-08-07 ENCOUNTER — APPOINTMENT (OUTPATIENT)
Age: 65
End: 2025-08-07

## 2025-08-14 ENCOUNTER — APPOINTMENT (OUTPATIENT)
Age: 65
End: 2025-08-14

## 2025-08-21 ENCOUNTER — APPOINTMENT (OUTPATIENT)
Age: 65
End: 2025-08-21

## 2025-08-28 ENCOUNTER — APPOINTMENT (OUTPATIENT)
Age: 65
End: 2025-08-28